# Patient Record
Sex: FEMALE | Race: WHITE | Employment: FULL TIME | URBAN - METROPOLITAN AREA
[De-identification: names, ages, dates, MRNs, and addresses within clinical notes are randomized per-mention and may not be internally consistent; named-entity substitution may affect disease eponyms.]

---

## 2018-02-26 PROBLEM — O99.519 ASTHMA AFFECTING PREGNANCY, ANTEPARTUM: Status: ACTIVE | Noted: 2018-02-26

## 2018-02-26 PROBLEM — J45.909 ASTHMA AFFECTING PREGNANCY, ANTEPARTUM: Status: ACTIVE | Noted: 2018-02-26

## 2018-02-26 PROBLEM — E03.9 HYPOTHYROID IN PREGNANCY, ANTEPARTUM, FIRST TRIMESTER: Status: ACTIVE | Noted: 2018-02-26

## 2018-02-26 PROBLEM — Z34.81 MULTIGRAVIDA IN FIRST TRIMESTER: Status: ACTIVE | Noted: 2018-02-26

## 2018-02-26 PROBLEM — O99.341 DEPRESSION DURING PREGNANCY IN FIRST TRIMESTER: Status: ACTIVE | Noted: 2018-02-26

## 2018-02-26 PROBLEM — O99.281 HYPOTHYROID IN PREGNANCY, ANTEPARTUM, FIRST TRIMESTER: Status: ACTIVE | Noted: 2018-02-26

## 2018-02-26 PROBLEM — F32.A DEPRESSION DURING PREGNANCY IN FIRST TRIMESTER: Status: ACTIVE | Noted: 2018-02-26

## 2018-05-21 PROBLEM — O28.3 FETAL ECHOGENIC INTRACARDIAC FOCUS ON PRENATAL ULTRASOUND: Status: ACTIVE | Noted: 2018-05-21

## 2018-05-21 PROBLEM — O99.342 DEPRESSION DURING PREGNANCY IN SECOND TRIMESTER: Status: ACTIVE | Noted: 2018-02-26

## 2018-05-21 PROBLEM — O99.282 HYPOTHYROIDISM AFFECTING PREGNANCY IN SECOND TRIMESTER: Status: ACTIVE | Noted: 2018-02-26

## 2018-05-21 PROBLEM — Z34.82 MULTIGRAVIDA IN SECOND TRIMESTER: Status: ACTIVE | Noted: 2018-02-26

## 2018-08-15 PROBLEM — O99.343: Status: ACTIVE | Noted: 2018-02-26

## 2018-08-15 PROBLEM — Z34.83 MULTIGRAVIDA IN THIRD TRIMESTER: Status: ACTIVE | Noted: 2018-02-26

## 2018-08-15 PROBLEM — O99.283 HYPOTHYROIDISM AFFECTING PREGNANCY IN THIRD TRIMESTER: Status: ACTIVE | Noted: 2018-02-26

## 2018-09-24 ENCOUNTER — HOSPITAL ENCOUNTER (EMERGENCY)
Age: 23
Discharge: HOME | End: 2018-09-24
Attending: OBSTETRICS & GYNECOLOGY | Admitting: OBSTETRICS & GYNECOLOGY
Payer: COMMERCIAL

## 2018-09-24 VITALS
HEIGHT: 61 IN | WEIGHT: 147 LBS | DIASTOLIC BLOOD PRESSURE: 69 MMHG | HEART RATE: 80 BPM | BODY MASS INDEX: 27.75 KG/M2 | TEMPERATURE: 97.7 F | SYSTOLIC BLOOD PRESSURE: 106 MMHG | RESPIRATION RATE: 16 BRPM

## 2018-09-24 PROBLEM — O36.8130 DECREASED FETAL MOVEMENT AFFECTING MANAGEMENT OF PREGNANCY IN THIRD TRIMESTER: Status: ACTIVE | Noted: 2018-09-24

## 2018-09-24 PROCEDURE — 99282 EMERGENCY DEPT VISIT SF MDM: CPT

## 2018-09-24 PROCEDURE — 76815 OB US LIMITED FETUS(S): CPT

## 2018-09-24 PROCEDURE — 59025 FETAL NON-STRESS TEST: CPT

## 2018-09-24 NOTE — IP AVS SNAPSHOT
[address] 
[phone] Patient: Abraham Ball MRN: [id number] MSD:6/36/7231 A check beth indicates which time of day the medication should be taken. My Medications ASK your doctor about these medications Instructions Each Dose to Equal  
 Morning Noon Evening Bedtime  
 albuterol 90 mcg/actuation inhaler Commonly known as:  PROAIR HFA Your last dose was: Your next dose is:    
   
   
 1-2 puffs q 4-6 hours prn wheezing  
     
   
   
   
  
 ferrous sulfate 324 mg (65 mg iron) tablet Your last dose was: Your next dose is: Take  by mouth Daily (before breakfast). levothyroxine 25 mcg tablet Commonly known as:  synthroid Your last dose was: Your next dose is: Take 1 Tab by mouth Daily (before breakfast). 25 mcg  
    
   
   
   
  
 mometasone-formoterol 200-5 mcg/actuation HFA inhaler Commonly known as:  Cheli Lopez Your last dose was: Your next dose is: Take 2 Puffs by inhalation two (2) times a day. 2 Puff  
    
   
   
   
  
 montelukast 10 mg tablet Commonly known as:  SINGULAIR Your last dose was: Your next dose is: Take 1 Tab by mouth daily. 10 mg PRENATAL PO Your last dose was: Your next dose is: Take  by mouth. ZyrTEC 10 mg Cap Generic drug:  Cetirizine Your last dose was: Your next dose is: Take  by mouth.

## 2018-09-24 NOTE — IP AVS SNAPSHOT
[address] 
[phone] Patient: Darleen Salazar MRN: [id number] ZEU:4[birth year]00/[birth year]48 About your hospitalization You were admitted on:  N/A You last received care in the:  [facility] You were discharged on:  September 24, 2018 Why you were hospitalized Your primary diagnosis was:  Not on File Your diagnoses also included:  Decreased Fetal Movement Affecting Management Of Pregnancy In Third Trimester Follow-up Information Follow up With Details Comments Contact Info George Duke MD   [address] 
[phone] Your Scheduled Appointments Tuesday September 25, 2018  1:30 PM EDT  
EST OB with MD [facility] ([facility]) [address] [place] [address]  
[phone] Thursday November 01, 2018  3:00 PM EDT  
LAB with [facility]  [address] ([place] [address]  
[phone] Thursday November 08, 2018  3:00 PM EST Extended Office Visit with Juice Aceves PA-C [address] ([place]) [place] [address]  
[phone] Discharge Orders None A check beth indicates which time of day the medication should be taken. My Medications ASK your doctor about these medications Instructions Each Dose to Equal  
 Morning Noon Evening Bedtime  
 albuterol 90 mcg/actuation inhaler Commonly known as:  PROAIR HFA Your last dose was: Your next dose is:    
   
   
 1-2 puffs q 4-6 hours prn wheezing  
     
   
   
   
  
 ferrous sulfate 324 mg (65 mg iron) tablet Your last dose was: Your next dose is: Take  by mouth Daily (before breakfast). levothyroxine 25 mcg tablet Commonly known as:  synthroid Your last dose was: Your next dose is: Take 1 Tab by mouth Daily (before breakfast). 25 mcg  
    
   
   
   
  
 mometasone-formoterol 200-5 mcg/actuation HFA inhaler Commonly known as:  Mccormick Marc Your last dose was: Your next dose is: Take 2 Puffs by inhalation two (2) times a day. 2 Puff  
    
   
   
   
  
 montelukast 10 mg tablet Commonly known as:  SINGULAIR Your last dose was: Your next dose is: Take 1 Tab by mouth daily. 10 mg PRENATAL PO Your last dose was: Your next dose is: Take  by mouth. ZyrTEC 10 mg Cap Generic drug:  Cetirizine Your last dose was: Your next dose is: Take  by mouth. Discharge Instructions Follow up as scheduled in office. Counting Your Baby's Kicks: Care Instructions Your Care Instructions Counting your baby's kicks is one way your doctor can tell that your baby is healthy. Most women-especially in a first pregnancy-feel their baby move for the first time between 16 and 22 weeks. The movement may feel like flutters rather than kicks. Your baby may move more at certain times of the day. When you are active, you may notice less kicking than when you are resting. At your prenatal visits, your doctor will ask whether the baby is active. In your last trimester, your doctor may ask you to count the number of times you feel your baby move. Follow-up care is a key part of your treatment and safety. Be sure to make and go to all appointments, and call your doctor if you are having problems. It's also a good idea to know your test results and keep a list of the medicines you take. How do you count fetal kicks?  
· A common method of checking your baby's movement is to count the number of kicks or moves you feel in 1 hour. Ten movements (such as kicks, flutters, or rolls) in 1 hour are normal. Some doctors suggest that you count in the morning until you get to 10 movements. Then you can quit for that day and start again the next day. · Pick your baby's most active time of day to count. This may be any time from morning to evening. · If you do not feel 10 movements in an hour, your baby may be sleeping. Wait for the next hour and count again. When should you call for help? Call your doctor now or seek immediate medical care if: 
  · You noticed that your baby has stopped moving or is moving much less than normal.  
 Watch closely for changes in your health, and be sure to contact your doctor if you have any problems. Where can you learn more? Go to http://tanner-jt.info/. Enter Z630 in the search box to learn more about \"Counting Your Baby's Kicks: Care Instructions. \" Current as of: 2017 Content Version: 11.7 © 0302-2499 [employer]. Care instructions adapted under license by [employer] (which disclaims liability or warranty for this information). If you have questions about a medical condition or this instruction, always ask your healthcare professional. [address] any warranty or liability for your use of this information. Pregnancy Precautions: Care Instructions Your Care Instructions There is no sure way to prevent labor before your due date ( labor) or to prevent most other pregnancy problems. But there are things you can do to increase your chances of a healthy pregnancy. Go to your appointments, follow your doctor's advice, and take good care of yourself. Eat well, and exercise (if your doctor agrees). And make sure to drink plenty of water. Follow-up care is a key part of your treatment and safety. Be sure to make and go to all appointments, and call your doctor if you are having problems. It's also a good idea to know your test results and keep a list of the medicines you take. How can you care for yourself at home? · Make sure you go to your prenatal appointments. At each visit, your doctor will check your blood pressure. Your doctor will also check to see if you have protein in your urine. High blood pressure and protein in urine are signs of preeclampsia. This condition can be dangerous for you and your baby. · Drink plenty of fluids, enough so that your urine is light yellow or clear like water. Dehydration can cause contractions. If you have kidney, heart, or liver disease and have to limit fluids, talk with your doctor before you increase the amount of fluids you drink. · Tell your doctor right away if you notice any symptoms of an infection, such as: ¨ Burning when you urinate. ¨ A foul-smelling discharge from your vagina. ¨ Vaginal itching. ¨ Unexplained fever. ¨ Unusual pain or soreness in your uterus or lower belly. · Eat a balanced diet. Include plenty of foods that are high in calcium and iron. ¨ Foods high in calcium include milk, cheese, yogurt, almonds, and broccoli. ¨ Foods high in iron include red meat, shellfish, poultry, eggs, beans, raisins, whole-grain bread, and leafy green vegetables. · Do not smoke. If you need help quitting, talk to your doctor about stop-smoking programs and medicines. These can increase your chances of quitting for good. · Do not drink alcohol or use illegal drugs. · Follow your doctor's directions about activity. Your doctor will let you know how much, if any, exercise you can do. · Ask your doctor if you can have sex. If you are at risk for early labor, your doctor may ask you to not have sex. · Take care to prevent falls. During pregnancy, your joints are loose, and your balance is off. Sports such as bicycling, skiing, or in-line skating can increase your risk of falling.  And don't ride horses or motorcycles, dive, water ski, scuba dive, or parachute jump while you are pregnant. · Avoid getting very hot. Do not use saunas or hot tubs. Avoid staying out in the sun in hot weather for long periods. Take acetaminophen (Tylenol) to lower a high fever. · Do not take any over-the-counter or herbal medicines or supplements without talking to your doctor or pharmacist first. 
When should you call for help? Call 911 anytime you think you may need emergency care. For example, call if: 
  · You passed out (lost consciousness).  
  · You have severe vaginal bleeding.  
  · You have severe pain in your belly or pelvis.  
  · You have had fluid gushing or leaking from your vagina and you know or think the umbilical cord is bulging into your vagina. If this happens, immediately get down on your knees so your rear end (buttocks) is higher than your head. This will decrease the pressure on the cord until help arrives.  
[facility] your doctor now or seek immediate medical care if: 
  · You have signs of preeclampsia, such as: 
¨ Sudden swelling of your face, hands, or feet. ¨ New vision problems (such as dimness or blurring). ¨ A severe headache.  
  · You have any vaginal bleeding.  
  · You have belly pain or cramping.  
  · You have a fever.  
  · You have had regular contractions (with or without pain) for an hour. This means that you have 8 or more within 1 hour or 4 or more in 20 minutes after you change your position and drink fluids.  
  · You have a sudden release of fluid from your vagina.  
  · You have low back pain or pelvic pressure that does not go away.  
  · You notice that your baby has stopped moving or is moving much less than normal.  
 Watch closely for changes in your health, and be sure to contact your doctor if you have any problems. Where can you learn more? Go to http://tanner-jt.info/. Enter [id number] in the search box to learn more about \"Pregnancy Precautions: Care Instructions. \" Current as of: November 21, 2017 
Content Version: 11.7 © 3142-6376 [employer]. Care instructions adapted under license by [employer] (which disclaims liability or warranty for this information). If you have questions about a medical condition or this instruction, always ask your healthcare professional. [address] any warranty or liability for your use of this information. Introducing \[facility]\"" & HEALTH SERVICES! [facility] introduces [facility] patient portal. Now you can access parts of your medical record, email your doctor's office, and request medication refills online. 1. In your internet browser, go to [email or web address] 2. Click on the First Time User? Click Here link in the Sign In box. You will see the New Member Sign Up page. 3. Enter your [facility] Access Code exactly as it appears below. You will not need to use this code after youve completed the sign-up process. If you do not sign up before the expiration date, you must request a new code. · [facility] Access Code: [email or web address] Expires: 11/12/2018  8:05 AM 
 
4. Enter the last four digits of your Social Security Number (xxxx) and Date of Birth (mm/dd/yyyy) as indicated and click Submit. You will be taken to the next sign-up page. 5. Create a [facility] ID. This will be your [facility] login ID and cannot be changed, so think of one that is secure and easy to remember. 6. Create a [facility] password. You can change your password at any time. 7. Enter your Password Reset Question and Answer. This can be used at a later time if you forget your password. 8. Enter your e-mail address. You will receive e-mail notification when new information is available in [address] 9. Click Sign Up. You can now view and download portions of your medical record. 10. Click the Download Summary menu link to download a portable copy of your medical information.  
 
If you have questions, please visit the Frequently Asked Questions section of the idiagt website. Remember, Perpetu is NOT to be used for urgent needs. For medical emergencies, dial 911. Now available from your iPhone and Android! Introducing Sae Hussein As a [employer] patient, I wanted to make you aware of our electronic visit tool called Sae Hussein. [employer] 24/7 allows you to connect within minutes with a medical provider 24 hours a day, seven days a week via a mobile device or tablet or logging into a secure website from your computer. You can access Sae Hussein from anywhere in the [place]. A virtual visit might be right for you when you have a simple condition and feel like you just dont want to get out of bed, or cant get away from work for an appointment, when your regular [employer] provider is not available (evenings, weekends or holidays), or when youre out of town and need minor care. Electronic visits cost only $49 and if the [employer] 24/7 provider determines a prescription is needed to treat your condition, one can be electronically transmitted to a nearby pharmacy*. Please take a moment to enroll today if you have not already done so. The enrollment process is free and takes just a few minutes. To enroll, please download the [employer] 24/7 ruel to your tablet or phone, or visit [email or web address] to enroll on your computer. And, as an [address] [place] [address] patient with a Prelert account, the results of your visits will be scanned into your electronic medical record and your primary care provider will be able to view the scanned results. We urge you to continue to see your regular [employer] provider for your ongoing medical care. And while your primary care provider may not be the one available when you seek a Sae Hussein virtual visit, the peace of mind you get from getting a real diagnosis real time can be priceless.    
 
For more information on [employer] 24/7, view our Frequently Asked Questions (FAQs) at [email or web address]. Sincerely, 
 
Amanda Calderon MD 
Chief Medical Officer [address] *:  certain medications cannot be prescribed via Sae Hussein Providers Seen During Your Hospitalization Provider Specialty Primary office phone Carlos Walker MD Obstetrics & Gynecology [phone] Your Primary Care Physician (PCP) Primary Care Physician Office Phone Office Fax [address] Sol, [address] [place] [address] [phone] You are allergic to the following Allergen Reactions Egg Anaphylaxis Influenza Virus Vaccine, Specific Anaphylaxis Peanut Anaphylaxis Adderall (Dextroamphetamine-Amphetamine) Other (comments) HEADACHE Augmentin (Amoxicillin-Pot Clavulanate) Rash Penicillins Rash Recent Documentation Height Weight BMI OB Status Smoking Status 1.549 m 66.7 kg 27.78 kg/m2 Pregnant Never Smoker Emergency Contacts Name Discharge Info Relation Home Work Mobile Ileana Figures  Parent [1] [phone] Christa Thomson  Spouse [3] [phone] Patient Belongings The following personal items are in your possession at time of discharge: 
                             
 
  
  
 Please provide this summary of care documentation to your next provider. Signatures-by signing, you are acknowledging that this After Visit Summary has been reviewed with you and you have received a copy. Patient Signature:  ____________________________________________________________ Date:  ____________________________________________________________  
  
Dillon Peng Provider Signature:  ____________________________________________________________ Date:  ____________________________________________________________

## 2018-09-25 NOTE — H&P
CC  Chief Complaint   Patient presents with    Decreased Fetal Movement       History:    21 y.o. female at 36w4d weeks gestation who requesting evaluation for Decreased fetal movement: Time of onset past several days. HISTORY:  OB History    Para Term  AB Living   2 1 1   1   SAB TAB Ectopic Molar Multiple Live Births        1      # Outcome Date GA Lbr Chris/2nd Weight Sex Delivery Anes PTL Lv   2 Current            1 Term 16 41w4d  3.374 kg M Vag-Spont EPI N HAIM          History   Sexual Activity    Sexual activity: Yes    Partners: Male    Birth control/ protection: None     Patient's last menstrual period was 2018. Social History     Social History    Marital status: SINGLE     Spouse name: N/A    Number of children: N/A    Years of education: N/A     Occupational History    Not on file.      Social History Main Topics    Smoking status: Never Smoker    Smokeless tobacco: Never Used    Alcohol use No    Drug use: No    Sexual activity: Yes     Partners: Male     Birth control/ protection: None     Other Topics Concern    Caffeine Concern No    Exercise No    Seat Belt Yes    Self-Exams No     Social History Narrative    Abuse: Feels safe at home, no history of physical abuse, no history of sexual abuse           Past Surgical History:   Procedure Laterality Date    HX HEENT   2013    sinus     HX TONSIL AND ADENOIDECTOMY      INSERT PLEURAL CATHETER      drain fluid off lung age 11-9       Past Medical History:   Diagnosis Date    Allergic rhinitis due to pollen     Asthma     Attention deficit disorder without mention of hyperactivity     Chronic tension headache     Contact dermatitis and other eczema, due to unspecified cause     Depressive disorder, not elsewhere classified     Dizziness and giddiness     Folliculitis     Herpetic gingivostomatitis     Hypothyroidism     Irritability     Knee pain     Low back pain     Myalgia and myositis, unspecified     Nausea alone     Other kyphoscoliosis and scoliosis     Personal history of allergy to penicillin     Raynaud's syndrome     Reflux          ROS:  No vb, lof, regular contractions    PHYSICAL EXAM:  Blood pressure 106/69, pulse 80, temperature 97.7 °F (36.5 °C), resp. rate 16, height 5' 1\" (1.549 m), weight 66.7 kg (147 lb), last menstrual period 01/02/2018, not currently breastfeeding. General: healthy, alert, well developed and well nourished  Resp:  breath sounds clear and equal bilaterally  Card:  RRR, no MRG  Abd: WNL. Fetal Assessment: Baseline FHR: 135 per minute     Fetal heart variability: moderate     Fetal Heart Rate decelerations: none     Fetal Heart Rate accelerations: yes  Prestentation: vertex by us  Pelvic:  deferred  Ext: no edema     SAMUEL 15    Assessment:  21 y.o. female at 36w4d weeks gestation with decreased fetal movement  with reassuring fetal status    Plan:  Discharge home with routine labor instructions and warning signs, Keep follow up appointment with regular provider as scheduled, Instructed in fetal activity monitoring     Alejandra Tran MD

## 2018-09-25 NOTE — PROGRESS NOTES
9/24/2018      RE: Aroldo Mccray      To Whom it May Concern: This is to certify that Aroldo Mccray may return to work on Wednesday, September 26. 2018. She was seen in our [address] on the 24th and will need to be out of work until Wednesday. Please feel free to contact her ob/gyn office if you have any questions or concerns. Thank you for your assistance in this matter.     Sincerely,      Samantha Duncan RN

## 2018-09-25 NOTE — PROGRESS NOTES
Pt d/c'd home in stable condition with FOB. Written and verbal d/c instructions given with pregnancy precautions, kick counts and  labor precautions. Pt states understanding, all questions answered. Pt to f/u as scheduled in office.

## 2018-09-25 NOTE — DISCHARGE INSTRUCTIONS
Follow up as scheduled in office. Counting Your Baby's Kicks: Care Instructions  Your Care Instructions    Counting your baby's kicks is one way your doctor can tell that your baby is healthy. Most women-especially in a first pregnancy-feel their baby move for the first time between 16 and 22 weeks. The movement may feel like flutters rather than kicks. Your baby may move more at certain times of the day. When you are active, you may notice less kicking than when you are resting. At your prenatal visits, your doctor will ask whether the baby is active. In your last trimester, your doctor may ask you to count the number of times you feel your baby move. Follow-up care is a key part of your treatment and safety. Be sure to make and go to all appointments, and call your doctor if you are having problems. It's also a good idea to know your test results and keep a list of the medicines you take. How do you count fetal kicks? · A common method of checking your baby's movement is to count the number of kicks or moves you feel in 1 hour. Ten movements (such as kicks, flutters, or rolls) in 1 hour are normal. Some doctors suggest that you count in the morning until you get to 10 movements. Then you can quit for that day and start again the next day. · Pick your baby's most active time of day to count. This may be any time from morning to evening. · If you do not feel 10 movements in an hour, your baby may be sleeping. Wait for the next hour and count again. When should you call for help? Call your doctor now or seek immediate medical care if:    · You noticed that your baby has stopped moving or is moving much less than normal.    Watch closely for changes in your health, and be sure to contact your doctor if you have any problems. Where can you learn more? Go to http://tanner-jt.info/.   Enter L421 in the search box to learn more about \"Counting Your Baby's Kicks: Care Instructions. \"  Current as of: 2017  Content Version: 11.7  © 3514-9211 [employer]. Care instructions adapted under license by [employer] (which disclaims liability or warranty for this information). If you have questions about a medical condition or this instruction, always ask your healthcare professional. [address] any warranty or liability for your use of this information. Pregnancy Precautions: Care Instructions  Your Care Instructions    There is no sure way to prevent labor before your due date ( labor) or to prevent most other pregnancy problems. But there are things you can do to increase your chances of a healthy pregnancy. Go to your appointments, follow your doctor's advice, and take good care of yourself. Eat well, and exercise (if your doctor agrees). And make sure to drink plenty of water. Follow-up care is a key part of your treatment and safety. Be sure to make and go to all appointments, and call your doctor if you are having problems. It's also a good idea to know your test results and keep a list of the medicines you take. How can you care for yourself at home? · Make sure you go to your prenatal appointments. At each visit, your doctor will check your blood pressure. Your doctor will also check to see if you have protein in your urine. High blood pressure and protein in urine are signs of preeclampsia. This condition can be dangerous for you and your baby. · Drink plenty of fluids, enough so that your urine is light yellow or clear like water. Dehydration can cause contractions. If you have kidney, heart, or liver disease and have to limit fluids, talk with your doctor before you increase the amount of fluids you drink. · Tell your doctor right away if you notice any symptoms of an infection, such as:  ¨ Burning when you urinate. ¨ A foul-smelling discharge from your vagina. ¨ Vaginal itching.   ¨ Unexplained fever.  ¨ Unusual pain or soreness in your uterus or lower belly. · Eat a balanced diet. Include plenty of foods that are high in calcium and iron. ¨ Foods high in calcium include milk, cheese, yogurt, almonds, and broccoli. ¨ Foods high in iron include red meat, shellfish, poultry, eggs, beans, raisins, whole-grain bread, and leafy green vegetables. · Do not smoke. If you need help quitting, talk to your doctor about stop-smoking programs and medicines. These can increase your chances of quitting for good. · Do not drink alcohol or use illegal drugs. · Follow your doctor's directions about activity. Your doctor will let you know how much, if any, exercise you can do. · Ask your doctor if you can have sex. If you are at risk for early labor, your doctor may ask you to not have sex. · Take care to prevent falls. During pregnancy, your joints are loose, and your balance is off. Sports such as bicycling, skiing, or in-line skating can increase your risk of falling. And don't ride horses or motorcycles, dive, water ski, scuba dive, or parachute jump while you are pregnant. · Avoid getting very hot. Do not use saunas or hot tubs. Avoid staying out in the sun in hot weather for long periods. Take acetaminophen (Tylenol) to lower a high fever. · Do not take any over-the-counter or herbal medicines or supplements without talking to your doctor or pharmacist first.  When should you call for help? Call 911 anytime you think you may need emergency care. For example, call if:    · You passed out (lost consciousness).     · You have severe vaginal bleeding.     · You have severe pain in your belly or pelvis.     · You have had fluid gushing or leaking from your vagina and you know or think the umbilical cord is bulging into your vagina. If this happens, immediately get down on your knees so your rear end (buttocks) is higher than your head.  This will decrease the pressure on the cord until help arrives.   [facility] your doctor now or seek immediate medical care if:    · You have signs of preeclampsia, such as:  ¨ Sudden swelling of your face, hands, or feet. ¨ New vision problems (such as dimness or blurring). ¨ A severe headache.     · You have any vaginal bleeding.     · You have belly pain or cramping.     · You have a fever.     · You have had regular contractions (with or without pain) for an hour. This means that you have 8 or more within 1 hour or 4 or more in 20 minutes after you change your position and drink fluids.     · You have a sudden release of fluid from your vagina.     · You have low back pain or pelvic pressure that does not go away.     · You notice that your baby has stopped moving or is moving much less than normal.    Watch closely for changes in your health, and be sure to contact your doctor if you have any problems. Where can you learn more? Go to http://tanner-jt.info/. Enter [id number] in the search box to learn more about \"Pregnancy Precautions: Care Instructions. \"  Current as of: November 21, 2017  Content Version: 11.7  © 0406-2004 [employer]. Care instructions adapted under license by [employer] (which disclaims liability or warranty for this information). If you have questions about a medical condition or this instruction, always ask your healthcare professional. [address] any warranty or liability for your use of this information.

## 2018-09-25 NOTE — PROGRESS NOTES
Pt to triage room MATHEUS 02 with c/o decreased fetal movement. Pt denies any LOF or VB. EFM/Alvan applied. Triage database and assessment completed. SVE deferred  Triage process explained to pt. Pt instructed on call light and placed within reach. Verbalized understanding to all teaching. Pt states she has felt fetal movement today, just less.   This RN will notify Dr Ena Severe of pt arrival, gest age, contraction pattern, hx, complaint etc.

## 2018-10-04 PROBLEM — O36.8130 DECREASED FETAL MOVEMENT AFFECTING MANAGEMENT OF PREGNANCY IN THIRD TRIMESTER: Status: RESOLVED | Noted: 2018-09-24 | Resolved: 2018-10-04

## 2018-10-11 PROBLEM — O36.5930 POOR FETAL GROWTH AFFECTING MANAGEMENT OF MOTHER IN THIRD TRIMESTER: Status: ACTIVE | Noted: 2018-10-11

## 2018-10-14 ENCOUNTER — ANESTHESIA (OUTPATIENT)
Dept: LABOR AND DELIVERY | Age: 23
DRG: 540 | End: 2018-10-14
Payer: COMMERCIAL

## 2018-10-14 ENCOUNTER — ANESTHESIA EVENT (OUTPATIENT)
Dept: LABOR AND DELIVERY | Age: 23
DRG: 540 | End: 2018-10-14
Payer: COMMERCIAL

## 2018-10-14 ENCOUNTER — HOSPITAL ENCOUNTER (INPATIENT)
Age: 23
LOS: 3 days | Discharge: HOME | DRG: 540 | End: 2018-10-17
Attending: OBSTETRICS & GYNECOLOGY | Admitting: OBSTETRICS & GYNECOLOGY
Payer: COMMERCIAL

## 2018-10-14 DIAGNOSIS — Z98.891 S/P CESAREAN SECTION: Primary | ICD-10-CM

## 2018-10-14 PROBLEM — O41.03X0 OLIGOHYDRAMNIOS IN THIRD TRIMESTER: Status: ACTIVE | Noted: 2018-10-14

## 2018-10-14 PROBLEM — O36.8390 VARIABLE FETAL HEART RATE DECELERATIONS, ANTEPARTUM: Status: ACTIVE | Noted: 2018-10-14

## 2018-10-14 PROBLEM — O36.5933 IUGR (INTRAUTERINE GROWTH RESTRICTION) AFFECTING CARE OF MOTHER, THIRD TRIMESTER, FETUS 3: Status: ACTIVE | Noted: 2018-10-14

## 2018-10-14 PROBLEM — O26.893 ABDOMINAL PAIN DURING PREGNANCY IN THIRD TRIMESTER: Status: ACTIVE | Noted: 2018-10-14

## 2018-10-14 PROBLEM — R10.9 ABDOMINAL PAIN DURING PREGNANCY IN THIRD TRIMESTER: Status: ACTIVE | Noted: 2018-10-14

## 2018-10-14 PROBLEM — Z37.9 NORMAL LABOR: Status: ACTIVE | Noted: 2018-10-14

## 2018-10-14 LAB
ABO + RH BLD: NORMAL
ALBUMIN SERPL-MCNC: 2.8 G/DL (ref 3.5–5)
ALBUMIN/GLOB SERPL: 0.8 {RATIO}
ALP SERPL-CCNC: 109 U/L (ref 50–136)
ALT SERPL-CCNC: 14 U/L (ref 12–65)
AMPHET UR QL SCN: NEGATIVE
ANION GAP SERPL CALC-SCNC: 7 MMOL/L
ARTERIAL PATENCY WRIST A: ABNORMAL
ARTERIAL PATENCY WRIST A: NORMAL
AST SERPL-CCNC: 12 U/L (ref 15–37)
BARBITURATES UR QL SCN: NEGATIVE
BASE DEFICIT BLD-SCNC: 2 MMOL/L
BASE EXCESS BLD CALC-SCNC: 3 MMOL/L
BDY SITE: ABNORMAL
BDY SITE: NORMAL
BENZODIAZ UR QL: NEGATIVE
BILIRUB SERPL-MCNC: 0.6 MG/DL (ref 0.2–1.1)
BLOOD GROUP ANTIBODIES SERPL: NORMAL
BODY TEMPERATURE: 98.6
BODY TEMPERATURE: 98.6
BUN SERPL-MCNC: 7 MG/DL (ref 6–23)
CALCIUM SERPL-MCNC: 8.7 MG/DL (ref 8.3–10.4)
CANNABINOIDS UR QL SCN: NEGATIVE
CHLORIDE SERPL-SCNC: 106 MMOL/L (ref 98–107)
CO2 SERPL-SCNC: 26 MMOL/L (ref 21–32)
COCAINE UR QL SCN: NEGATIVE
CREAT SERPL-MCNC: 0.64 MG/DL (ref 0.6–1)
ERYTHROCYTE [DISTWIDTH] IN BLOOD BY AUTOMATED COUNT: 11.9 %
GAS FLOW.O2 O2 DELIVERY SYS: ABNORMAL L/MIN
GAS FLOW.O2 O2 DELIVERY SYS: NORMAL L/MIN
GLOBULIN SER CALC-MCNC: 3.6 G/DL (ref 2.3–3.5)
GLUCOSE SERPL-MCNC: 86 MG/DL (ref 65–100)
HCO3 BLD-SCNC: 31 MMOL/L (ref 22–26)
HCT VFR BLD AUTO: 39.8 % (ref 35.8–46.3)
HGB BLD-MCNC: 13.1 G/DL (ref 11.7–15.4)
MCH RBC QN AUTO: 31.4 PG (ref 26.1–32.9)
MCHC RBC AUTO-ENTMCNC: 32.9 G/DL (ref 31.4–35)
MCV RBC AUTO: 95.4 FL (ref 79.6–97.8)
METHADONE UR QL: NEGATIVE
NRBC # BLD: 0 K/UL (ref 0–0.2)
OPIATES UR QL: NEGATIVE
PCO2 BLDCO: 45 MMHG (ref 32–68)
PCO2 BLDCO: 59 MMHG (ref 32–68)
PCP UR QL: NEGATIVE
PH BLDCO: 7.33 [PH] (ref 7.15–7.38)
PH BLDCO: 7.33 [PH] (ref 7.15–7.38)
PLATELET # BLD AUTO: 200 K/UL (ref 150–450)
PMV BLD AUTO: 11.1 FL (ref 9.4–12.3)
PO2 BLDCO: 11 MMHG
PO2 BLDCO: 20 MMHG
POTASSIUM SERPL-SCNC: 3.8 MMOL/L (ref 3.5–5.1)
PROT SERPL-MCNC: 6.4 G/DL
RBC # BLD AUTO: 4.17 M/UL (ref 4.05–5.2)
SAO2 % BLD: 10 % (ref 95–98)
SERVICE CMNT-IMP: ABNORMAL
SERVICE CMNT-IMP: NORMAL
SODIUM SERPL-SCNC: 139 MMOL/L (ref 136–145)
SPECIMEN EXP DATE BLD: NORMAL
SPECIMEN TYPE: ABNORMAL
SPECIMEN TYPE: NORMAL
URATE SERPL-MCNC: 4.4 MG/DL (ref 2.6–6)
WBC # BLD AUTO: 11.6 K/UL (ref 4.3–11.1)

## 2018-10-14 PROCEDURE — 77030018846 HC SOL IRR STRL H20 ICUM -A: Performed by: OBSTETRICS & GYNECOLOGY

## 2018-10-14 PROCEDURE — 77030034696 HC CATH URETH FOL 2W BARD -A: Performed by: OBSTETRICS & GYNECOLOGY

## 2018-10-14 PROCEDURE — 75410000003 HC RECOV DEL/VAG/CSECN EA 0.5 HR: Performed by: OBSTETRICS & GYNECOLOGY

## 2018-10-14 PROCEDURE — 77030005518 HC CATH URETH FOL 2W BARD -B: Performed by: OBSTETRICS & GYNECOLOGY

## 2018-10-14 PROCEDURE — 99284 EMERGENCY DEPT VISIT MOD MDM: CPT | Performed by: OBSTETRICS & GYNECOLOGY

## 2018-10-14 PROCEDURE — 74011250636 HC RX REV CODE- 250/636: Performed by: ANESTHESIOLOGY

## 2018-10-14 PROCEDURE — 74011000258 HC RX REV CODE- 258: Performed by: OBSTETRICS & GYNECOLOGY

## 2018-10-14 PROCEDURE — 86901 BLOOD TYPING SEROLOGIC RH(D): CPT

## 2018-10-14 PROCEDURE — 76060000078 HC EPIDURAL ANESTHESIA: Performed by: OBSTETRICS & GYNECOLOGY

## 2018-10-14 PROCEDURE — 74011250637 HC RX REV CODE- 250/637: Performed by: ANESTHESIOLOGY

## 2018-10-14 PROCEDURE — 74011250636 HC RX REV CODE- 250/636

## 2018-10-14 PROCEDURE — 76010000391 HC C SECN FIRST 1 HR: Performed by: OBSTETRICS & GYNECOLOGY

## 2018-10-14 PROCEDURE — 77030020255 HC SOL INJ LR 1000ML BG

## 2018-10-14 PROCEDURE — 76060000078 HC EPIDURAL ANESTHESIA

## 2018-10-14 PROCEDURE — 74011250636 HC RX REV CODE- 250/636: Performed by: OBSTETRICS & GYNECOLOGY

## 2018-10-14 PROCEDURE — 77030002888 HC SUT CHRMC J&J -A: Performed by: OBSTETRICS & GYNECOLOGY

## 2018-10-14 PROCEDURE — 82803 BLOOD GASES ANY COMBINATION: CPT

## 2018-10-14 PROCEDURE — 77030018836 HC SOL IRR NACL ICUM -A: Performed by: OBSTETRICS & GYNECOLOGY

## 2018-10-14 PROCEDURE — 65270000029 HC RM PRIVATE

## 2018-10-14 PROCEDURE — 77030032490 HC SLV COMPR SCD KNE COVD -B: Performed by: OBSTETRICS & GYNECOLOGY

## 2018-10-14 PROCEDURE — 75410000003 HC RECOV DEL/VAG/CSECN EA 0.5 HR

## 2018-10-14 PROCEDURE — 80307 DRUG TEST PRSMV CHEM ANLYZR: CPT

## 2018-10-14 PROCEDURE — 36415 COLL VENOUS BLD VENIPUNCTURE: CPT

## 2018-10-14 PROCEDURE — 4A1HXCZ MONITORING OF PRODUCTS OF CONCEPTION, CARDIAC RATE, EXTERNAL APPROACH: ICD-10-PCS | Performed by: OBSTETRICS & GYNECOLOGY

## 2018-10-14 PROCEDURE — 77030007880 HC KT SPN EPDRL BBMI -B: Performed by: ANESTHESIOLOGY

## 2018-10-14 PROCEDURE — 74011000250 HC RX REV CODE- 250

## 2018-10-14 PROCEDURE — 85027 COMPLETE CBC AUTOMATED: CPT

## 2018-10-14 PROCEDURE — 84550 ASSAY OF BLOOD/URIC ACID: CPT

## 2018-10-14 PROCEDURE — 80053 COMPREHEN METABOLIC PANEL: CPT

## 2018-10-14 PROCEDURE — 77030003665 HC NDL SPN BBMI -A: Performed by: ANESTHESIOLOGY

## 2018-10-14 PROCEDURE — 77030020254 HC SOL INJ D5LR LACTATED RINGER

## 2018-10-14 PROCEDURE — 77030031139 HC SUT VCRL2 J&J -A: Performed by: OBSTETRICS & GYNECOLOGY

## 2018-10-14 RX ORDER — MORPHINE SULFATE 10 MG/ML
5 INJECTION, SOLUTION INTRAMUSCULAR; INTRAVENOUS
Status: DISCONTINUED | OUTPATIENT
Start: 2018-10-14 | End: 2018-10-14 | Stop reason: SDUPTHER

## 2018-10-14 RX ORDER — ONDANSETRON 2 MG/ML
4 INJECTION INTRAMUSCULAR; INTRAVENOUS AS NEEDED
Status: DISCONTINUED | OUTPATIENT
Start: 2018-10-14 | End: 2018-10-14 | Stop reason: SDUPTHER

## 2018-10-14 RX ORDER — TRISODIUM CITRATE DIHYDRATE AND CITRIC ACID MONOHYDRATE 500; 334 MG/5ML; MG/5ML
30 SOLUTION ORAL
Status: COMPLETED | OUTPATIENT
Start: 2018-10-14 | End: 2018-10-14

## 2018-10-14 RX ORDER — SODIUM CHLORIDE 0.9 % (FLUSH) 0.9 %
5-10 SYRINGE (ML) INJECTION AS NEEDED
Status: DISCONTINUED | OUTPATIENT
Start: 2018-10-14 | End: 2018-10-15 | Stop reason: SDUPTHER

## 2018-10-14 RX ORDER — BUTORPHANOL TARTRATE 1 MG/ML
1 INJECTION INTRAMUSCULAR; INTRAVENOUS
Status: DISCONTINUED | OUTPATIENT
Start: 2018-10-14 | End: 2018-10-14 | Stop reason: HOSPADM

## 2018-10-14 RX ORDER — SODIUM CHLORIDE, SODIUM LACTATE, POTASSIUM CHLORIDE, CALCIUM CHLORIDE 600; 310; 30; 20 MG/100ML; MG/100ML; MG/100ML; MG/100ML
75 INJECTION, SOLUTION INTRAVENOUS CONTINUOUS
Status: DISCONTINUED | OUTPATIENT
Start: 2018-10-14 | End: 2018-10-17 | Stop reason: ALTCHOICE

## 2018-10-14 RX ORDER — EPHEDRINE SULFATE 50 MG/ML
INJECTION, SOLUTION INTRAVENOUS AS NEEDED
Status: DISCONTINUED | OUTPATIENT
Start: 2018-10-14 | End: 2018-10-14 | Stop reason: HOSPADM

## 2018-10-14 RX ORDER — SODIUM CHLORIDE 0.9 % (FLUSH) 0.9 %
5-10 SYRINGE (ML) INJECTION EVERY 8 HOURS
Status: DISCONTINUED | OUTPATIENT
Start: 2018-10-14 | End: 2018-10-15 | Stop reason: SDUPTHER

## 2018-10-14 RX ORDER — NALBUPHINE HYDROCHLORIDE 10 MG/ML
5 INJECTION, SOLUTION INTRAMUSCULAR; INTRAVENOUS; SUBCUTANEOUS
Status: DISCONTINUED | OUTPATIENT
Start: 2018-10-14 | End: 2018-10-15 | Stop reason: ALTCHOICE

## 2018-10-14 RX ORDER — ONDANSETRON 2 MG/ML
INJECTION INTRAMUSCULAR; INTRAVENOUS AS NEEDED
Status: DISCONTINUED | OUTPATIENT
Start: 2018-10-14 | End: 2018-10-14 | Stop reason: HOSPADM

## 2018-10-14 RX ORDER — LEVOTHYROXINE SODIUM 50 UG/1
25 TABLET ORAL DAILY
Status: DISCONTINUED | OUTPATIENT
Start: 2018-10-15 | End: 2018-10-17 | Stop reason: HOSPADM

## 2018-10-14 RX ORDER — NALBUPHINE HYDROCHLORIDE 10 MG/ML
5 INJECTION, SOLUTION INTRAMUSCULAR; INTRAVENOUS; SUBCUTANEOUS
Status: DISCONTINUED | OUTPATIENT
Start: 2018-10-14 | End: 2018-10-14 | Stop reason: SDUPTHER

## 2018-10-14 RX ORDER — NALOXONE HYDROCHLORIDE 0.4 MG/ML
0.1 INJECTION, SOLUTION INTRAMUSCULAR; INTRAVENOUS; SUBCUTANEOUS
Status: DISCONTINUED | OUTPATIENT
Start: 2018-10-14 | End: 2018-10-14 | Stop reason: SDUPTHER

## 2018-10-14 RX ORDER — KETOROLAC TROMETHAMINE 30 MG/ML
30 INJECTION, SOLUTION INTRAMUSCULAR; INTRAVENOUS
Status: DISPENSED | OUTPATIENT
Start: 2018-10-14 | End: 2018-10-15

## 2018-10-14 RX ORDER — SODIUM CHLORIDE, SODIUM LACTATE, POTASSIUM CHLORIDE, CALCIUM CHLORIDE 600; 310; 30; 20 MG/100ML; MG/100ML; MG/100ML; MG/100ML
INJECTION, SOLUTION INTRAVENOUS
Status: DISCONTINUED | OUTPATIENT
Start: 2018-10-14 | End: 2018-10-14 | Stop reason: HOSPADM

## 2018-10-14 RX ORDER — KETOROLAC TROMETHAMINE 30 MG/ML
30 INJECTION, SOLUTION INTRAMUSCULAR; INTRAVENOUS
Status: DISCONTINUED | OUTPATIENT
Start: 2018-10-14 | End: 2018-10-14 | Stop reason: SDUPTHER

## 2018-10-14 RX ORDER — MORPHINE SULFATE 10 MG/ML
INJECTION, SOLUTION INTRAMUSCULAR; INTRAVENOUS AS NEEDED
Status: DISCONTINUED | OUTPATIENT
Start: 2018-10-14 | End: 2018-10-14 | Stop reason: HOSPADM

## 2018-10-14 RX ORDER — MINERAL OIL
120 OIL (ML) ORAL
Status: DISCONTINUED | OUTPATIENT
Start: 2018-10-14 | End: 2018-10-14 | Stop reason: HOSPADM

## 2018-10-14 RX ORDER — NALOXONE HYDROCHLORIDE 0.4 MG/ML
0.1 INJECTION, SOLUTION INTRAMUSCULAR; INTRAVENOUS; SUBCUTANEOUS
Status: DISCONTINUED | OUTPATIENT
Start: 2018-10-14 | End: 2018-10-15 | Stop reason: ALTCHOICE

## 2018-10-14 RX ORDER — FENTANYL CITRATE 50 UG/ML
INJECTION, SOLUTION INTRAMUSCULAR; INTRAVENOUS AS NEEDED
Status: DISCONTINUED | OUTPATIENT
Start: 2018-10-14 | End: 2018-10-14 | Stop reason: HOSPADM

## 2018-10-14 RX ORDER — LIDOCAINE HYDROCHLORIDE 10 MG/ML
1 INJECTION INFILTRATION; PERINEURAL
Status: DISCONTINUED | OUTPATIENT
Start: 2018-10-14 | End: 2018-10-14 | Stop reason: HOSPADM

## 2018-10-14 RX ORDER — SODIUM CHLORIDE, SODIUM LACTATE, POTASSIUM CHLORIDE, CALCIUM CHLORIDE 600; 310; 30; 20 MG/100ML; MG/100ML; MG/100ML; MG/100ML
75 INJECTION, SOLUTION INTRAVENOUS CONTINUOUS
Status: DISCONTINUED | OUTPATIENT
Start: 2018-10-14 | End: 2018-10-14 | Stop reason: SDUPTHER

## 2018-10-14 RX ORDER — OXYCODONE HYDROCHLORIDE 5 MG/1
10 TABLET ORAL
Status: DISCONTINUED | OUTPATIENT
Start: 2018-10-14 | End: 2018-10-14 | Stop reason: SDUPTHER

## 2018-10-14 RX ORDER — SODIUM CHLORIDE 0.9 % (FLUSH) 0.9 %
5-10 SYRINGE (ML) INJECTION EVERY 8 HOURS
Status: DISCONTINUED | OUTPATIENT
Start: 2018-10-14 | End: 2018-10-14 | Stop reason: HOSPADM

## 2018-10-14 RX ORDER — OXYTOCIN/RINGER'S LACTATE 30/500 ML
PLASTIC BAG, INJECTION (ML) INTRAVENOUS
Status: DISCONTINUED | OUTPATIENT
Start: 2018-10-14 | End: 2018-10-14 | Stop reason: HOSPADM

## 2018-10-14 RX ORDER — MORPHINE SULFATE 10 MG/ML
5 INJECTION, SOLUTION INTRAMUSCULAR; INTRAVENOUS
Status: ACTIVE | OUTPATIENT
Start: 2018-10-14 | End: 2018-10-15

## 2018-10-14 RX ORDER — DEXTROSE, SODIUM CHLORIDE, SODIUM LACTATE, POTASSIUM CHLORIDE, AND CALCIUM CHLORIDE 5; .6; .31; .03; .02 G/100ML; G/100ML; G/100ML; G/100ML; G/100ML
125 INJECTION, SOLUTION INTRAVENOUS CONTINUOUS
Status: DISCONTINUED | OUTPATIENT
Start: 2018-10-14 | End: 2018-10-14 | Stop reason: HOSPADM

## 2018-10-14 RX ORDER — ONDANSETRON 2 MG/ML
4 INJECTION INTRAMUSCULAR; INTRAVENOUS AS NEEDED
Status: COMPLETED | OUTPATIENT
Start: 2018-10-14 | End: 2018-10-15

## 2018-10-14 RX ORDER — SODIUM CHLORIDE 0.9 % (FLUSH) 0.9 %
5-10 SYRINGE (ML) INJECTION AS NEEDED
Status: DISCONTINUED | OUTPATIENT
Start: 2018-10-14 | End: 2018-10-14 | Stop reason: HOSPADM

## 2018-10-14 RX ORDER — LIDOCAINE HYDROCHLORIDE 20 MG/ML
JELLY TOPICAL
Status: DISCONTINUED | OUTPATIENT
Start: 2018-10-14 | End: 2018-10-14 | Stop reason: HOSPADM

## 2018-10-14 RX ORDER — KETOROLAC TROMETHAMINE 30 MG/ML
INJECTION, SOLUTION INTRAMUSCULAR; INTRAVENOUS AS NEEDED
Status: DISCONTINUED | OUTPATIENT
Start: 2018-10-14 | End: 2018-10-14 | Stop reason: HOSPADM

## 2018-10-14 RX ORDER — OXYCODONE HYDROCHLORIDE 5 MG/1
10 TABLET ORAL
Status: DISPENSED | OUTPATIENT
Start: 2018-10-14 | End: 2018-10-15

## 2018-10-14 RX ORDER — DEXAMETHASONE SODIUM PHOSPHATE 4 MG/ML
INJECTION, SOLUTION INTRA-ARTICULAR; INTRALESIONAL; INTRAMUSCULAR; INTRAVENOUS; SOFT TISSUE AS NEEDED
Status: DISCONTINUED | OUTPATIENT
Start: 2018-10-14 | End: 2018-10-14 | Stop reason: HOSPADM

## 2018-10-14 RX ORDER — OXYTOCIN/0.9 % SODIUM CHLORIDE 15/250 ML
250 PLASTIC BAG, INJECTION (ML) INTRAVENOUS ONCE
Status: DISCONTINUED | OUTPATIENT
Start: 2018-10-14 | End: 2018-10-14 | Stop reason: HOSPADM

## 2018-10-14 RX ORDER — CEFAZOLIN SODIUM/WATER 2 G/20 ML
2 SYRINGE (ML) INTRAVENOUS
Status: COMPLETED | OUTPATIENT
Start: 2018-10-14 | End: 2018-10-14

## 2018-10-14 RX ADMIN — EPHEDRINE SULFATE 5 MG: 50 INJECTION, SOLUTION INTRAVENOUS at 19:29

## 2018-10-14 RX ADMIN — DEXAMETHASONE SODIUM PHOSPHATE 8 MG: 4 INJECTION, SOLUTION INTRA-ARTICULAR; INTRALESIONAL; INTRAMUSCULAR; INTRAVENOUS; SOFT TISSUE at 19:49

## 2018-10-14 RX ADMIN — OXYCODONE HYDROCHLORIDE 10 MG: 5 TABLET ORAL at 22:45

## 2018-10-14 RX ADMIN — KETOROLAC TROMETHAMINE 30 MG: 30 INJECTION, SOLUTION INTRAMUSCULAR; INTRAVENOUS at 19:51

## 2018-10-14 RX ADMIN — MORPHINE SULFATE 2.8 MG: 10 INJECTION, SOLUTION INTRAMUSCULAR; INTRAVENOUS at 19:42

## 2018-10-14 RX ADMIN — MORPHINE SULFATE 1 MG: 10 INJECTION, SOLUTION INTRAMUSCULAR; INTRAVENOUS at 19:39

## 2018-10-14 RX ADMIN — SODIUM CHLORIDE, SODIUM LACTATE, POTASSIUM CHLORIDE, CALCIUM CHLORIDE: 600; 310; 30; 20 INJECTION, SOLUTION INTRAVENOUS at 19:14

## 2018-10-14 RX ADMIN — Medication 250 ML/HR: at 19:36

## 2018-10-14 RX ADMIN — Medication 2 G: at 18:44

## 2018-10-14 RX ADMIN — FENTANYL CITRATE 50 MCG: 50 INJECTION, SOLUTION INTRAMUSCULAR; INTRAVENOUS at 19:50

## 2018-10-14 RX ADMIN — FENTANYL CITRATE 50 MCG: 50 INJECTION, SOLUTION INTRAMUSCULAR; INTRAVENOUS at 19:44

## 2018-10-14 RX ADMIN — EPHEDRINE SULFATE 10 MG: 50 INJECTION, SOLUTION INTRAVENOUS at 19:24

## 2018-10-14 RX ADMIN — SODIUM CHLORIDE, SODIUM LACTATE, POTASSIUM CHLORIDE, CALCIUM CHLORIDE: 600; 310; 30; 20 INJECTION, SOLUTION INTRAVENOUS at 19:24

## 2018-10-14 RX ADMIN — SODIUM CITRATE AND CITRIC ACID MONOHYDRATE 30 ML: 500; 334 SOLUTION ORAL at 18:45

## 2018-10-14 RX ADMIN — AZITHROMYCIN MONOHYDRATE 500 MG: 500 INJECTION, POWDER, LYOPHILIZED, FOR SOLUTION INTRAVENOUS at 18:44

## 2018-10-14 RX ADMIN — ONDANSETRON 4 MG: 2 INJECTION INTRAMUSCULAR; INTRAVENOUS at 19:37

## 2018-10-14 RX ADMIN — MORPHINE SULFATE 1 MG: 10 INJECTION, SOLUTION INTRAMUSCULAR; INTRAVENOUS at 19:38

## 2018-10-14 RX ADMIN — KETOROLAC TROMETHAMINE 30 MG: 30 INJECTION, SOLUTION INTRAMUSCULAR at 22:45

## 2018-10-14 RX ADMIN — CEFAZOLIN 1 G: 1 INJECTION, POWDER, FOR SOLUTION INTRAMUSCULAR; INTRAVENOUS at 22:46

## 2018-10-14 NOTE — H&P
History & Physical 
 
Name: Miquel De La Rosa MRN: [id number]  SSN: [id number] YOB: 1995  Age: 21 y.o. Sex: female Subjective:  
 
Reason for Admission:  Pregnancy and fetal decels History of Present Illness: Ms. Karin Gibbs is a 21 y. o. female with an estimated gestational age of 38w3d with Estimated Date of Delivery: 10/18/18. Patient complains of mild contractions and decreased fetal movement since this am. Pregnancy has been complicated by mild fetal growth restriction. Recent ultrasound showed baby at 18th %ile and AC at 13&ile with wu of 14. Pt was scheduled for induction on Wednesday. Patient denies abdominal pain  , headache , nausea and vomiting, swelling, vaginal bleeding  and vaginal leaking of fluid . OB History  Para Term  AB Living 2 1 1   1 SAB TAB Ectopic Molar Multiple Live Births 1 # Outcome Date GA Lbr Chris/2nd Weight Sex Delivery Anes PTL Lv  
2 Current 1 Term 16 41w4d  3.374 kg M Vag-Spont EPI N HAIM Past Medical History:  
Diagnosis Date  Allergic rhinitis due to pollen  Asthma  Attention deficit disorder without mention of hyperactivity  Chronic tension headache  Contact dermatitis and other eczema, due to unspecified cause  Depressive disorder, not elsewhere classified  Dizziness and giddiness  Folliculitis  Herpetic gingivostomatitis  Hypothyroidism  Irritability  Knee pain  Low back pain  Myalgia and myositis, unspecified  Nausea alone  Other kyphoscoliosis and scoliosis  Personal history of allergy to penicillin  Raynaud's syndrome  Reflux Past Surgical History:  
Procedure Laterality Date  HX HEENT  2012  
 sinus  HX TONSIL AND ADENOIDECTOMY  INSERT PLEURAL CATHETER    
 drain fluid off lung age 11-9 Social History Occupational History  Not on file. Social History Main Topics  Smoking status: Never Smoker  Smokeless tobacco: Never Used  Alcohol use No  
 Drug use: No  
 Sexual activity: Yes  
  Partners: Male Birth control/ protection: None Family History Problem Relation Age of Onset  Attention Deficit Hyperactivity Disorder Mother  No Known Problems Sister  No Known Problems Brother  No Known Problems Brother  Coronary Artery Disease Maternal Grandmother  Diabetes Maternal Grandmother Allergies Allergen Reactions  Egg Anaphylaxis  Influenza Virus Vaccine, Specific Anaphylaxis  Peanut Anaphylaxis  Adderall [Dextroamphetamine-Amphetamine] Other (comments) HEADACHE  Augmentin [Amoxicillin-Pot Clavulanate] Rash  Penicillins Rash Prior to Admission medications Medication Sig Start Date End Date Taking? Authorizing Provider Cetirizine (ZYRTEC) 10 mg cap Take  by mouth. Yes Historical Provider  
levothyroxine (SYNTHROID) 25 mcg tablet Take 1 Tab by mouth Daily (before breakfast). 5/8/18  Yes Buster Argueta PA-C  
mometasone-formoterol (DULERA) 200-5 mcg/actuation HFA inhaler Take 2 Puffs by inhalation two (2) times a day. 5/8/18  Yes Buster Argueta PA-C  
montelukast (SINGULAIR) 10 mg tablet Take 1 Tab by mouth daily. 5/8/18  Yes Buster Argueta PA-C  
PNV KD.54/[id number] FUM/FOLIC AC (PRENATAL PO) Take  by mouth. Yes Historical Provider  
ferrous sulfate 324 mg (65 mg iron) tablet Take  by mouth Daily (before breakfast). Yes Historical Provider  
albuterol [facility] [place][note 2] HFA) 90 mcg/actuation inhaler 1-2 puffs q 4-6 hours prn wheezing 5/8/18   Buster Argueta PA-C Review of Systems: A comprehensive review of systems was negative except for that written in the History of Present Illness. Objective:  
 
Vitals:   
Vitals:  
 10/14/18 1416 10/14/18 1417 10/14/18 1419 10/14/18 1506 BP:  104/76 104/76 112/73 Pulse:  100 100 78 Resp:      
Temp:      
Weight: 67.6 kg (149 lb) Height: 5' 1\" (1.549 m) Temp (24hrs), Av °F (36.7 °C), Min:98 °F (36.7 °C), Max:98 °F (36.7 °C) BP  Min: 104/76  Max: 112/73 Physical Exam: 
Patient without distress. Heart: Regular rate and rhythm Lung: clear to auscultation throughout lung fields, no wheezes, no rales, no rhonchi and normal respiratory effort Back: costovertebral angle tenderness absent Abdomen: soft, nontender Fundus: soft and non tender Perineum: blood absent, amniotic fluid absent Cervical Exam: 3 cm dilated 60% effaced   
-2 station Presenting Part: cephalic Lower Extremities:  - Edema No 
 - Patellar Reflexes: 2+ bilaterally ; abrasions on legs bilat- hx eczema Membranes:  Intact Uterine Activity:  occasional  
Fetal Heart Rate:  Baseline: 130 per minute Variability: moderate Accelerations: no 
Decelerations: variable  - pt had 3 decels within first 15 minutes in triage without obvious contractions- moved to L&D and IV started Bedside ultrasound- vertex, grade 3 anterior placenta, wu = 4 Lab/Data Review: 
Recent Results (from the past 24 hour(s)) DRUG SCREEN, URINE Collection Time: 10/14/18  2:44 PM  
Result Value Ref Range PCP(PHENCYCLIDINE) NEGATIVE BENZODIAZEPINES NEGATIVE     
 COCAINE NEGATIVE AMPHETAMINES NEGATIVE METHADONE NEGATIVE     
 THC (TH-CANNABINOL) NEGATIVE     
 OPIATES NEGATIVE     
 BARBITURATES NEGATIVE     
CBC W/O DIFF Collection Time: 10/14/18  2:45 PM  
Result Value Ref Range WBC 11.6 (H) 4.3 - 11.1 K/uL  
 RBC 4.17 4.05 - 5.2 M/uL  
 HGB 13.1 11.7 - 15.4 g/dL HCT 39.8 35.8 - 46.3 % MCV 95.4 79.6 - 97.8 FL  
 MCH 31.4 26.1 - 32.9 PG  
 MCHC 32.9 31.4 - 35.0 g/dL  
 RDW 11.9 % PLATELET 782 827 - 023 K/uL MPV 11.1 9.4 - 12.3 FL ABSOLUTE NRBC 0.00 0.0 - 0.2 K/uL  
TYPE & SCREEN Collection Time: 10/14/18  2:45 PM  
Result Value Ref Range Crossmatch Expiration 10/17/2018 ABO/Rh(D) O POSITIVE Antibody screen NEG METABOLIC PANEL, COMPREHENSIVE Collection Time: 10/14/18  3:44 PM  
Result Value Ref Range Sodium 139 136 - 145 mmol/L Potassium 3.8 3.5 - 5.1 mmol/L Chloride 106 98 - 107 mmol/L  
 CO2 26 21 - 32 mmol/L Anion gap 7 mmol/L Glucose 86 65 - 100 mg/dL BUN 7 6 - 23 MG/DL Creatinine 0.64 0.6 - 1.0 MG/DL  
 GFR est AA >60 >60 ml/min/1.73m2 GFR est non-AA >60 ml/min/1.73m2 Calcium 8.7 8.3 - 10.4 MG/DL Bilirubin, total 0.6 0.2 - 1.1 MG/DL  
 ALT (SGPT) 14 12 - 65 U/L  
 AST (SGOT) 12 (L) 15 - 37 U/L Alk. phosphatase 109 50 - 136 U/L Protein, total 6.4 g/dL Albumin 2.8 (L) 3.5 - 5.0 g/dL Globulin 3.6 (H) 2.3 - 3.5 g/dL A-G Ratio 0.8 URIC ACID Collection Time: 10/14/18  3:44 PM  
Result Value Ref Range Uric acid 4.4 2.6 - 6.0 MG/DL Assessment and Plan: Active Problems: 
  Abdominal pain during pregnancy in third trimester (10/14/2018) Normal labor (10/14/2018) Variable fetal heart rate decelerations, antepartum (10/14/2018) IUGR (intrauterine growth restriction) affecting care of mother, third trimester, fetus 3 (10/14/2018) - Oligohydramnios:  fetal decels Iv hydrate, consider  if decels do not resolve- if reassuring- plan for vag delivery Pt presented with mild contractions and decreased fetal movement. Immediately upon admission to triage , pt was noted to have 3 decels. Pt was moved to L&D and iv started for fluid bolus. Decels resolved and pt even began having accels. Pregnancy with mild iugr with growth at 18%ile. Noted to lower wu today. Will admit and monitor- c-sections if decels persist, vag delivery if tracing is reassuring. Consider augmentation if needed. Signed By:  Antoinette Corona MD   
 2018

## 2018-10-14 NOTE — PROGRESS NOTES
Labor Progress Note Patient seen, fetal heart rate and contraction pattern evaluated, patient examined. Patient admitted because of variables and lates noted in triage Patient Vitals for the past 1 hrs: 
 BP Pulse 10/14/18 1739 107/55 77 Physical Exam: 
Cervical Exam:  3 cm dilated Membranes:  Intact Uterine Activity: Frequency: Every 6-8 minutes and Intensity: mild Fetal Heart Rate: subtle lates noted Assessment/Plan: 
Proceed with  Section Nonreassuring fetal status with labor not progressing normally. Recommended proceeding with  delivery. Risks of bleeding, infection, bladder and bowel damage explained to patient's family. They understand the situation and consent to the  delivery.

## 2018-10-14 NOTE — ANESTHESIA PROCEDURE NOTES
Spinal Block Start time: 10/14/2018 7:18 PM 
End time: 10/14/2018 7:21 PM 
Performed by: Sonia Cain Authorized by: Sonia Cain  
 
Pre-procedure: Indications: primary anesthetic  Preanesthetic Checklist: patient identified, risks and benefits discussed, anesthesia consent, site marked, patient being monitored and timeout performed Timeout Time: 19:18 Spinal Block:  
Patient Position:  Seated Prep: chlorhexidine and patient draped Location:  L2-3 Technique:  Single shot Needle:  
Needle Type:  Pencan Needle Gauge:  25 G Attempts:  1 Events: CSF confirmed, no blood with aspiration and no paresthesia Assessment: 
Insertion:  Uncomplicated Patient tolerance:  Patient tolerated the procedure well with no immediate complications 9 mgs hyperbaric bupivacaine with 0.2 mgs preserv-free MS04

## 2018-10-14 NOTE — PROGRESS NOTES
Birth of viable baby boy. APGARs 8/9. matthew Beck at delivery. See MD notes for initial infant assessment.

## 2018-10-14 NOTE — PROGRESS NOTES
Pt had prolonged deceleration that lasted 3 1/2 mins down in the 100's. IVF bolus started, pt turned to right lateral, oxygen 10L via mask.

## 2018-10-14 NOTE — ANESTHESIA PREPROCEDURE EVALUATION
Anesthetic History No history of anesthetic complications Review of Systems / Medical History Patient summary reviewed and pertinent labs reviewed Pulmonary Asthma Neuro/Psych Psychiatric history Comments: ADD/depression Cardiovascular Exercise tolerance: >4 METS 
  
GI/Hepatic/Renal 
Within defined limits Endo/Other Hypothyroidism: well controlled Other Findings Comments: Egg allergy 
kyphosis and scoliosis Physical Exam 
 
Airway Mallampati: III 
TM Distance: 4 - 6 cm Neck ROM: normal range of motion Mouth opening: Diminished (comment) Cardiovascular Rhythm: regular Dental 
No notable dental hx Pulmonary Abdominal 
GI exam deferred Other Findings Anesthetic Plan ASA: 2, emergent Anesthesia type: spinal 
 
 
 
 
 
Anesthetic plan and risks discussed with: Patient

## 2018-10-15 LAB
BASOPHILS # BLD: 0 K/UL (ref 0–0.2)
BASOPHILS NFR BLD: 0 % (ref 0–2)
DIFFERENTIAL METHOD BLD: ABNORMAL
EOSINOPHIL # BLD: 0 K/UL (ref 0–0.8)
EOSINOPHIL NFR BLD: 0 % (ref 0.5–7.8)
ERYTHROCYTE [DISTWIDTH] IN BLOOD BY AUTOMATED COUNT: 11.9 %
HCT VFR BLD AUTO: 36.7 % (ref 35.8–46.3)
HGB BLD-MCNC: 11.9 G/DL (ref 11.7–15.4)
IMM GRANULOCYTES # BLD: 0.1 K/UL (ref 0–0.5)
IMM GRANULOCYTES NFR BLD AUTO: 0 % (ref 0–5)
LYMPHOCYTES # BLD: 0.9 K/UL (ref 0.5–4.6)
LYMPHOCYTES NFR BLD: 6 % (ref 13–44)
MCH RBC QN AUTO: 31.9 PG (ref 26.1–32.9)
MCHC RBC AUTO-ENTMCNC: 32.4 G/DL (ref 31.4–35)
MCV RBC AUTO: 98.4 FL (ref 79.6–97.8)
MONOCYTES # BLD: 0.5 K/UL (ref 0.1–1.3)
MONOCYTES NFR BLD: 3 % (ref 4–12)
NEUTS SEG # BLD: 14.2 K/UL (ref 1.7–8.2)
NEUTS SEG NFR BLD: 90 % (ref 43–78)
NRBC # BLD: 0 K/UL (ref 0–0.2)
PLATELET # BLD AUTO: 196 K/UL (ref 150–450)
PMV BLD AUTO: 11.8 FL (ref 9.4–12.3)
RBC # BLD AUTO: 3.73 M/UL (ref 4.05–5.2)
WBC # BLD AUTO: 15.7 K/UL (ref 4.3–11.1)

## 2018-10-15 PROCEDURE — 74011250636 HC RX REV CODE- 250/636: Performed by: ANESTHESIOLOGY

## 2018-10-15 PROCEDURE — 74011250637 HC RX REV CODE- 250/637: Performed by: ANESTHESIOLOGY

## 2018-10-15 PROCEDURE — 36415 COLL VENOUS BLD VENIPUNCTURE: CPT

## 2018-10-15 PROCEDURE — 65270000029 HC RM PRIVATE

## 2018-10-15 PROCEDURE — 74011250636 HC RX REV CODE- 250/636: Performed by: OBSTETRICS & GYNECOLOGY

## 2018-10-15 PROCEDURE — 87340 HEPATITIS B SURFACE AG IA: CPT

## 2018-10-15 PROCEDURE — 74011000258 HC RX REV CODE- 258: Performed by: OBSTETRICS & GYNECOLOGY

## 2018-10-15 PROCEDURE — 85025 COMPLETE CBC W/AUTO DIFF WBC: CPT

## 2018-10-15 PROCEDURE — 74011250637 HC RX REV CODE- 250/637: Performed by: OBSTETRICS & GYNECOLOGY

## 2018-10-15 RX ORDER — SODIUM CHLORIDE 0.9 % (FLUSH) 0.9 %
5-10 SYRINGE (ML) INJECTION AS NEEDED
Status: DISCONTINUED | OUTPATIENT
Start: 2018-10-15 | End: 2018-10-17 | Stop reason: ALTCHOICE

## 2018-10-15 RX ORDER — IBUPROFEN 800 MG/1
800 TABLET ORAL
Status: DISCONTINUED | OUTPATIENT
Start: 2018-10-15 | End: 2018-10-17 | Stop reason: HOSPADM

## 2018-10-15 RX ORDER — DIPHENHYDRAMINE HCL 25 MG
25 CAPSULE ORAL
Status: DISCONTINUED | OUTPATIENT
Start: 2018-10-15 | End: 2018-10-17 | Stop reason: HOSPADM

## 2018-10-15 RX ORDER — OXYCODONE AND ACETAMINOPHEN 7.5; 325 MG/1; MG/1
1 TABLET ORAL
Status: DISCONTINUED | OUTPATIENT
Start: 2018-10-15 | End: 2018-10-17 | Stop reason: HOSPADM

## 2018-10-15 RX ORDER — SIMETHICONE 80 MG
80 TABLET,CHEWABLE ORAL
Status: DISCONTINUED | OUTPATIENT
Start: 2018-10-15 | End: 2018-10-17 | Stop reason: HOSPADM

## 2018-10-15 RX ORDER — AMOXICILLIN 250 MG
2 CAPSULE ORAL DAILY
Status: DISCONTINUED | OUTPATIENT
Start: 2018-10-16 | End: 2018-10-17 | Stop reason: HOSPADM

## 2018-10-15 RX ORDER — PRENATAL VIT 96/IRON FUM/FOLIC 27MG-0.8MG
1 TABLET ORAL DAILY
Status: DISCONTINUED | OUTPATIENT
Start: 2018-10-16 | End: 2018-10-17 | Stop reason: HOSPADM

## 2018-10-15 RX ORDER — SODIUM CHLORIDE 0.9 % (FLUSH) 0.9 %
5-10 SYRINGE (ML) INJECTION EVERY 8 HOURS
Status: DISCONTINUED | OUTPATIENT
Start: 2018-10-15 | End: 2018-10-17 | Stop reason: ALTCHOICE

## 2018-10-15 RX ORDER — ONDANSETRON 8 MG/1
8 TABLET, ORALLY DISINTEGRATING ORAL
Status: DISCONTINUED | OUTPATIENT
Start: 2018-10-15 | End: 2018-10-17 | Stop reason: HOSPADM

## 2018-10-15 RX ORDER — OXYCODONE AND ACETAMINOPHEN 7.5; 325 MG/1; MG/1
2 TABLET ORAL
Status: DISCONTINUED | OUTPATIENT
Start: 2018-10-15 | End: 2018-10-17 | Stop reason: HOSPADM

## 2018-10-15 RX ORDER — NALOXONE HYDROCHLORIDE 0.4 MG/ML
0.4 INJECTION, SOLUTION INTRAMUSCULAR; INTRAVENOUS; SUBCUTANEOUS AS NEEDED
Status: DISCONTINUED | OUTPATIENT
Start: 2018-10-15 | End: 2018-10-17 | Stop reason: HOSPADM

## 2018-10-15 RX ADMIN — KETOROLAC TROMETHAMINE 30 MG: 30 INJECTION, SOLUTION INTRAMUSCULAR at 04:18

## 2018-10-15 RX ADMIN — LEVOTHYROXINE SODIUM 25 MCG: 50 TABLET ORAL at 22:05

## 2018-10-15 RX ADMIN — KETOROLAC TROMETHAMINE 30 MG: 30 INJECTION, SOLUTION INTRAMUSCULAR at 10:15

## 2018-10-15 RX ADMIN — SIMETHICONE CHEW TAB 80 MG 80 MG: 80 TABLET ORAL at 22:05

## 2018-10-15 RX ADMIN — CEFAZOLIN 1 G: 1 INJECTION, POWDER, FOR SOLUTION INTRAMUSCULAR; INTRAVENOUS at 04:20

## 2018-10-15 RX ADMIN — IBUPROFEN 800 MG: 800 TABLET ORAL at 22:05

## 2018-10-15 RX ADMIN — ONDANSETRON 4 MG: 2 INJECTION INTRAMUSCULAR; INTRAVENOUS at 00:38

## 2018-10-15 RX ADMIN — KETOROLAC TROMETHAMINE 30 MG: 30 INJECTION, SOLUTION INTRAMUSCULAR at 16:05

## 2018-10-15 RX ADMIN — CEFAZOLIN 1 G: 1 INJECTION, POWDER, FOR SOLUTION INTRAMUSCULAR; INTRAVENOUS at 10:16

## 2018-10-15 RX ADMIN — Medication 10 ML: at 16:06

## 2018-10-15 NOTE — PROGRESS NOTES
Pt is S/P primary  at 39 3/7 weeks due to NR-FHTs. No complaints today. Normal PO pain. No GI/ issues. Lochia < menses. No F/C. Visit Vitals  /58 (BP 1 Location: Left arm, BP Patient Position: At rest)  Pulse 63  Temp 98 °F (36.7 °C)  Resp 18  Ht 5' 1\" (1.549 m)  Wt 149 lb (67.6 kg)  SpO2 96%  Breastfeeding Unknown  BMI 28.15 kg/m2 CV - RRR 
LUNGS - CTA bilaterally ABD - soft, appropriate tenderness, incision C/D/I 
EXT - tr edema bilaterally Labs:   
Recent Results (from the past 24 hour(s)) DRUG SCREEN, URINE Collection Time: 10/14/18  2:44 PM  
Result Value Ref Range PCP(PHENCYCLIDINE) NEGATIVE BENZODIAZEPINES NEGATIVE     
 COCAINE NEGATIVE AMPHETAMINES NEGATIVE METHADONE NEGATIVE     
 THC (TH-CANNABINOL) NEGATIVE     
 OPIATES NEGATIVE     
 BARBITURATES NEGATIVE     
CBC W/O DIFF Collection Time: 10/14/18  2:45 PM  
Result Value Ref Range WBC 11.6 (H) 4.3 - 11.1 K/uL  
 RBC 4.17 4.05 - 5.2 M/uL  
 HGB 13.1 11.7 - 15.4 g/dL HCT 39.8 35.8 - 46.3 % MCV 95.4 79.6 - 97.8 FL  
 MCH 31.4 26.1 - 32.9 PG  
 MCHC 32.9 31.4 - 35.0 g/dL  
 RDW 11.9 % PLATELET 457 047 - 594 K/uL MPV 11.1 9.4 - 12.3 FL ABSOLUTE NRBC 0.00 0.0 - 0.2 K/uL  
TYPE & SCREEN Collection Time: 10/14/18  2:45 PM  
Result Value Ref Range Crossmatch Expiration 10/17/2018 ABO/Rh(D) O POSITIVE Antibody screen NEG METABOLIC PANEL, COMPREHENSIVE Collection Time: 10/14/18  3:44 PM  
Result Value Ref Range Sodium 139 136 - 145 mmol/L Potassium 3.8 3.5 - 5.1 mmol/L Chloride 106 98 - 107 mmol/L  
 CO2 26 21 - 32 mmol/L Anion gap 7 mmol/L Glucose 86 65 - 100 mg/dL BUN 7 6 - 23 MG/DL Creatinine 0.64 0.6 - 1.0 MG/DL  
 GFR est AA >60 >60 ml/min/1.73m2 GFR est non-AA >60 ml/min/1.73m2 Calcium 8.7 8.3 - 10.4 MG/DL  Bilirubin, total 0.6 0.2 - 1.1 MG/DL  
 ALT (SGPT) 14 12 - 65 U/L  
 AST (SGOT) 12 (L) 15 - 37 U/L  
 Alk. phosphatase 109 50 - 136 U/L Protein, total 6.4 g/dL Albumin 2.8 (L) 3.5 - 5.0 g/dL Globulin 3.6 (H) 2.3 - 3.5 g/dL A-G Ratio 0.8 URIC ACID Collection Time: 10/14/18  3:44 PM  
Result Value Ref Range Uric acid 4.4 2.6 - 6.0 MG/DL  
BLOOD GAS, CORD BLOOD Collection Time: 10/14/18  7:47 PM  
Result Value Ref Range Device: ROOM AIR    
 pH, cord blood (POC) 7.325 7. 15 - 7.38    
 pCO2 cord blood 59 32 - 68 mmHg  
 pO2 cord blood 11 mmHg HCO3 (POC) 31.0 (H) 22 - 26 MMOL/L  
 sO2 (POC) 10 (L) 95 - 98 % Base excess (POC) 3 mmol/L Allens test (POC) NOT APPLICABLE Site CORD Patient temp. 98.6 Specimen type (POC) ARTERIAL CORD Performed by [id number], CORD BLOOD Collection Time: 10/14/18  7:51 PM  
Result Value Ref Range Device: OTHER    
 pH, cord blood (POC) 7.334 7.15 - 7.38    
 pCO2 cord blood 45 32 - 68 mmHg  
 pO2 cord blood 20 mmHg Base deficit (POC) 2 mmol/L Allens test (POC) NOT APPLICABLE Site CORD Patient temp. 98.6 Specimen type (POC) VENOUS CORD Performed by [place]   
CBC WITH AUTOMATED DIFF Collection Time: 10/15/18  6:27 AM  
Result Value Ref Range WBC 15.7 (H) 4.3 - 11.1 K/uL  
 RBC 3.73 (L) 4.05 - 5.2 M/uL  
 HGB 11.9 11.7 - 15.4 g/dL HCT 36.7 35.8 - 46.3 % MCV 98.4 (H) 79.6 - 97.8 FL  
 MCH 31.9 26.1 - 32.9 PG  
 MCHC 32.4 31.4 - 35.0 g/dL  
 RDW 11.9 % PLATELET 478 246 - 139 K/uL MPV 11.8 9.4 - 12.3 FL ABSOLUTE NRBC 0.00 0.0 - 0.2 K/uL  
 DF AUTOMATED NEUTROPHILS 90 (H) 43 - 78 % LYMPHOCYTES 6 (L) 13 - 44 % MONOCYTES 3 (L) 4.0 - 12.0 % EOSINOPHILS 0 (L) 0.5 - 7.8 % BASOPHILS 0 0.0 - 2.0 % IMMATURE GRANULOCYTES 0 0.0 - 5.0 %  
 ABS. NEUTROPHILS 14.2 (H) 1.7 - 8.2 K/UL  
 ABS. LYMPHOCYTES 0.9 0.5 - 4.6 K/UL  
 ABS. MONOCYTES 0.5 0.1 - 1.3 K/UL  
 ABS. EOSINOPHILS 0.0 0.0 - 0.8 K/UL  
 ABS. BASOPHILS 0.0 0.0 - 0.2 K/UL  
 ABS. IMM. GRANS. 0.1 0.0 - 0.5 K/UL  
 
 
POD #1 LTCS Pt is breast feeding and plans on P4 method for birth control. Stable, cont. routine PP/PO care Odin Gagnon MD 
1:22 PM 
10/15/18

## 2018-10-15 NOTE — PROGRESS NOTES
Report received from [facility], RN. Patient care assumed-bedside reporting completed.

## 2018-10-15 NOTE — L&D DELIVERY NOTE
Delivery Summary    Patient: Ariana Maya MRN: [id number]  SSN: [id number]    YOB: 1995  Age: 21 y.o. Sex: female        Information for the patient's :  Sierra Face [[id number]]       Labor Events:    Labor: No   Rupture Date:     Rupture Time:     Rupture Type Intact   Amniotic Fluid Volume: Scant    Amniotic Fluid Description:       Induction: None       Augmentation: None   Labor Events: Fetal Intolerance; Other (comment)     Cervical Ripening:     None     Delivery Events:  Episiotomy:     Laceration(s):       Repaired:      Number of Repair Packets:     Suture Type and Size:       Estimated Blood Loss (ml): 800.00ml       Delivery Date: 10/14/2018    Delivery Time: 7:35 PM  Delivery Type: , Low Transverse   Details    Trial of Labor: Yes   Primary/Repeat: Primary   Priority: Emergency   Indications:  Fetal Intolerance of Labor   Incision type:     Sex:  Male     Gestational Age: 38w3d  Delivery Clinician:  Elsa Thomas  Living Status: Living   Delivery Location: OR OR 2          APGARS  One minute Five minutes Ten minutes   Skin color: 0   1        Heart rate: 2   2        Grimace: 2   2        Muscle tone: 2   2        Breathin   2        Totals: 8   9          Presentation: Vertex    Position:        Resuscitation Method:  Suctioning-bulb; Tactile Stimulation; Other (Comment) scant fluid   Meconium Stained: Thin      Cord Vessels: 3 Vessels      Cord Events:    Cord Blood Sent?:  Yes    Blood Gases Sent?:  Yes    Placenta:  Date/Time: 10/14  7:36 PM  Removal: Expressed      Appearance: Intact; Other (comment)     [place] Measurements:  Birth Weight: 6 lb 8.6 oz (2.965 kg)      Birth Length: 50 cm      Head Circumference: 33 cm      Chest Circumference: 32.5 cm     Abdominal Girth:       Other Providers:   ELSA THOMAS;MAYELIN CUNNINGHAM;EVER SUNSHINE;JENI WHITMAN;JOSH HERRERA;JOSE MCKAY;JENNIFER SPARKS;SHAN TIRADO CLEMENT HARVEY, Obstetrician;Primary Nurse;Primary  Nurse;Respiratory Therapist;Neonatologist;Anesthesiologist;Crna;Scrub Tech;Scrub Tech             Group B Strep: Negative 2018

## 2018-10-15 NOTE — LACTATION NOTE
In to see mom and infant for the first time. Mom and dad state that infant is latching and nursing well. Reviewed with mom and dad the second night of life and answered. Also reviewed positioning of infant to get and maintain a good latch. Lactation consultant will follow up tomorrow.

## 2018-10-15 NOTE — PROGRESS NOTES
SBAR OUT Report: Mother Verbal report given to ALEXANDRU Villasenor (full name & credentials) on this patient, who is now being transferred to MIU (unit) for routine progression of care. The patient is wearing a green \"Anesthesia-Duramorph\" band. Report consisted of patient's Situation, Background, Assessment and Recommendations (SBAR). Fleming ID bands were compared with the identification form, and verified with the patient and receiving nurse. Information from the SBAR, OR Summary, Procedure Summary, Intake/Output, MAR and Recent Results and the Courtney Report was reviewed with the receiving nurse; opportunity for questions and clarification provided. Fundal Check with oncoming RN.

## 2018-10-15 NOTE — OP NOTES
Section Delivery Procedure Note         Name: Tulio France      Medical Record Number: [id number]      YOB: 1995     Today's Date: 2018      Preoperative Diagnosis: intrauterine pregnancy at term; fetal intolerance of labor; oligohydramnios    Postoperative Diagnosis: Same    Procedure: Low Cervical Transverse    SECTION    Surgeon(s):  Saad Sevilla MD    Anesthesia:  spinal    Prophylactic Antibiotics: Ancef 2 gm    EBL: 800 ml         Fetal Description: soler male    Birth Information: Information for the patient's :  Gabi Miss [[id number]]   One Minute Apgar: 8 (Filed from Delivery Summary)  Five  Minute Apgar: 9 (Filed from Delivery Summary)      Umbilical Cord: 3 vessels present and Cord blood sent to lab for type, Rh, and Nikunj' test    Placenta:  Expressed  intact    Specimens:   ID Type Source Tests Collected by Time Destination   1 :  Placenta   Donovan Menon MD 10/14/2018 1939 Discarded               Complications:  fetal intolerance of labor; oligohydramnios    Procedure Details: The patient was taken to the operating room, where spinal anesthesia was administered and found to be adequate. Nelson catheter had been placed using sterile technique. The patient was prepped and draped in the normal sterile fashion. The abdomen was entered using the Pfannenstiel technique. The peritoneum was entered sharply well superior to the bladder. The bladder blade was then inserted. The vesicouterine and peritoneum was identified and entered sharply with Metzenbaum scissors. The bladder flap was then created sharply and the bladder blade was reinserted. A low transverse uterine incision was made with the scalpel and extended laterally with blunt finger dissection. Amniotomy was performed and the fluid was scant thin meconium. The babys head was then delivered atraumatically. The nose and mouth were suctioned.  The cord was clamped and cut and the baby was handed off to the waiting  care unit staff. Placenta was then expressed from the uterus. The uterus was exteriorized and cleared of all clots and debris. The uterine incision was closed with number 1 Chromic in running locking fashion followed by an imbricating stitch with good hemostasis assured. The posterior cul-de-sac was irrigated with warm normal saline. Good hemostasis was again reassured and the uterus was returned to the abdomen. The anterior pelvis was irrigated with warm normal saline and good hemostasis was again reassured throughout. The anterior peritoneum was closed with 3-0 Vicryl. The fascia was closed with 0 Vicrly in a running fashion. Good hemostasis was assured. The subcuticular layers were reapproximated with 2-0 plain gut. The skin was closed with a 4-0 Vicryl in a subcuticular fashion. Steri-strips were applied. The patient tolerated the procedure well. Sponge, lap, and needle counts were correct times three and the patient and baby were taken to recovery/postpartum room in stable condition.     Dolores Pierre MD      2018

## 2018-10-15 NOTE — PROGRESS NOTES
SBAR IN Report: Mother Verbal report received from [facility] (full name & credentials) on this patient, who is now being transferred from Labor and Delivery (unit) for routine progression of care. The patient is wearing a green \"Anesthesia-Duramorph\" band. Report consisted of patient's Situation, Background, Assessment and Recommendations (SBAR).  ID bands were compared with the identification form, and verified with the patient and transferring nurse. Information from the SBAR and the [place]legate Report was reviewed with the transferring nurse; opportunity for questions and clarification provided.

## 2018-10-15 NOTE — PROGRESS NOTES
OOB to restroom after discontinued linares. Pericare taught. Tolerated ambulation well. Linens and gown changed. Patient unable to void at this time. Instructed to call nurse for the next two measured voids. Patient demonstrates understanding of saving urinary output in measured specimen hat.

## 2018-10-15 NOTE — PROGRESS NOTES
Initial visit made with patient and . Meal tickets given per patient relations request. 
Alida Mora M.Div.

## 2018-10-15 NOTE — PROGRESS NOTES
Dr. Brian Godinez notified that pt takes 25mcg of synthroid every PM. Orders for 25 mcg synthroid given by MD.

## 2018-10-15 NOTE — PROGRESS NOTES
Post-op Day 1 S/P L/T C/S Via SAB. Post-op analgesia adequate via intrathecal narcotics and available parenteral/po narcotics and NSAID's.

## 2018-10-15 NOTE — ANESTHESIA POSTPROCEDURE EVALUATION
Post-Anesthesia Evaluation and Assessment Patient: Michelle Mead MRN: [id number]  SSN: [id number] YOB: 1995  Age: 21 y.o. Sex: female Cardiovascular Function/Vital Signs Visit Vitals  /71 (BP 1 Location: Left arm)  Pulse 66  Temp 36.4 °C (97.5 °F)  Resp 18  Ht 5' 1\" (1.549 m)  Wt 67.6 kg (149 lb)  SpO2 97%  Breastfeeding Unknown  BMI 28.15 kg/m2 Patient is status post spinal anesthesia for Procedure(s):  SECTION. Nausea/Vomiting: None Postoperative hydration reviewed and adequate. Pain: 
Pain Scale 1: Numeric (0 - 10) (10/14/18 2245) Pain Intensity 1: 7 (10/14/18 2245) Managed Neurological Status:  
Neuro (WDL): Within Defined Limits (10/14/18 2020) Neuro LUE Motor Response: Purposeful (10/14/18 2020) LLE Motor Response: Pharmacologically paralyzed (10/14/18 2020) RUE Motor Response: Purposeful (10/14/18 2020) RLE Motor Response: Pharmacologically paralyzed (10/14/18 2020) At baseline Mental Status and Level of Consciousness: Arousable Pulmonary Status:  
O2 Device: Room air (10/14/18 2035) Adequate oxygenation and airway patent Complications related to anesthesia: None Post-anesthesia assessment completed. No concerns Signed By: Hugh Burrows MD   
 2018

## 2018-10-15 NOTE — PROGRESS NOTES
SW assessment due to documentation in chart indicating history of depression. Patient states that she has \"felt fine\" during pregnancy. She was on Wellbutrin prior to pregnancy and then discontinued medication when she became pregnant. Patient states that she came off of her Wellbutrin during her first pregnancy and then resumed it after she delivered. She plans on doing the same thing now and will resume the Wellbutrin now that she has delivered. Patient denies ever receiving therapy for depression. Patient reports having a strong support system of family in the area. Progress note from  indicates that family was provided with meal vouchers via patient relations.  inquired about any current financial hardships. This was denied by family. Family states that they have car seat and all needed items to care for baby. Patient recently enrolled in [facility] program.   
 
 provided informational packet on  mood disorder education/resources. Family receptive to receiving information and denied any additional needs from . Family has this 's contact information should any needs/questions arise. Ghada [address]

## 2018-10-16 LAB — HBV SURFACE AG SERPL QL IA: NEGATIVE

## 2018-10-16 PROCEDURE — 74011250637 HC RX REV CODE- 250/637: Performed by: OBSTETRICS & GYNECOLOGY

## 2018-10-16 PROCEDURE — 77030014008 HC SPNG HEMSTAT J&J -C

## 2018-10-16 PROCEDURE — 74011250637 HC RX REV CODE- 250/637: Performed by: ANESTHESIOLOGY

## 2018-10-16 PROCEDURE — 65270000029 HC RM PRIVATE

## 2018-10-16 RX ADMIN — SIMETHICONE CHEW TAB 80 MG 80 MG: 80 TABLET ORAL at 08:52

## 2018-10-16 RX ADMIN — PRENATAL VIT W/ FE FUMARATE-FA TAB 27-0.8 MG 1 TABLET: 27-0.8 TAB at 08:52

## 2018-10-16 RX ADMIN — IBUPROFEN 800 MG: 800 TABLET ORAL at 17:47

## 2018-10-16 RX ADMIN — OXYCODONE HYDROCHLORIDE AND ACETAMINOPHEN 2 TABLET: 7.5; 325 TABLET ORAL at 22:51

## 2018-10-16 RX ADMIN — LEVOTHYROXINE SODIUM 25 MCG: 50 TABLET ORAL at 21:50

## 2018-10-16 RX ADMIN — IBUPROFEN 800 MG: 800 TABLET ORAL at 05:13

## 2018-10-16 RX ADMIN — IBUPROFEN 800 MG: 800 TABLET ORAL at 11:24

## 2018-10-16 RX ADMIN — IBUPROFEN 800 MG: 800 TABLET ORAL at 23:39

## 2018-10-16 RX ADMIN — SIMETHICONE CHEW TAB 80 MG 80 MG: 80 TABLET ORAL at 11:24

## 2018-10-16 RX ADMIN — SIMETHICONE CHEW TAB 80 MG 80 MG: 80 TABLET ORAL at 17:47

## 2018-10-16 RX ADMIN — SENNOSIDES AND DOCUSATE SODIUM 2 TABLET: 8.6; 5 TABLET ORAL at 08:51

## 2018-10-16 RX ADMIN — SIMETHICONE CHEW TAB 80 MG 80 MG: 80 TABLET ORAL at 21:51

## 2018-10-16 NOTE — PROGRESS NOTES
Pt is S/P primary  at 39 3/7 weeks due to NR-FHTs. No complaints today. Normal PO pain. No GI/ issues. Lochia < menses. No F/C. Visit Vitals  /63 (BP 1 Location: Left arm, BP Patient Position: At rest)  Pulse 68  Temp 97.8 °F (36.6 °C)  Resp 17  Ht 5' 1\" (1.549 m)  Wt 149 lb (67.6 kg)  SpO2 96%  Breastfeeding Unknown  BMI 28.15 kg/m2 CV - RRR 
LUNGS - CTA bilaterally ABD - soft, appropriate tenderness, incision C/D/I 
EXT - tr edema bilaterally Labs:   
Recent Results (from the past 24 hour(s)) HEPATITIS B SURFACE AG W/REFLEX Collection Time: 10/15/18  3:17 PM  
Result Value Ref Range Hep B surface Ag screen NEGATIVE  NEGATIVE    
 
 
POD #2 LTCS Pt is breast feeding and plans on P4 for birth control. Stable, cont. routine PP/PO care Brian Colvin MD 
12:57 PM 
10/16/18 What to expect when you have contrast    During your exam, we will inject  contrast  into your vein or artery. (Contrast is a clear liquid with iodine in it. It shows up on X-rays.)    You may feel warm or hot. You may have a metal taste in your mouth and a slight upset stomach. You may also feel pressure near the kidneys and bladder. These effects will last about 1 to 3 minutes.    Please tell us if you have:    Sneezing     Itching    Hives     Swelling in the face    A hoarse voice    Breathing problems    Other new symptoms    Serious problems are rare.  They may include:    Irregular heartbeat     Seizures    Kidney failure              Tissue damage    Shock      Death    If you have any problems during the exam, we  will treat them right away.    When you get home    Call your hospital if you have any new symptoms in the next 2 days, like hives or swelling. (Phone numbers are at the bottom of this page.) Or call your family doctor.     If you have wheezing or trouble breathing, call 911.    Self-care  -Drink at least 4 extra glasses of water today.   This reduces the stress on your kidneys.  -Keep taking your regular medicines.    The contrast will pass out of your body in your  Urine(pee). This will happen in the next 24 hours. You  will not feel this. Your urine will not  change color.    If you have kidney problems or take metformin    Drink 4 to 8 large glasses of water for the next  2 days, if you are not on a fluid restriction.    ?If you take metformin (Glucophage or Glucovance) for diabetes, keep taking it.      ?Your kidney function tests are abnormal.  If you take Metformin, do not take it for 48 hours. Please go to your clinic for a blood test within 3 days after your exam before the restarting this medicine.     (Note to provider:please give patient prescription for lab tests.)    ?Special instructions: ***    I have read and understand the above information.    Patient Sign  Here:______________________________________Date:________Time:______    Staff Sign Here:________________________________________Date:_______Time:______      Radiology Departments:     ?[facility]: [phone] ?[place]: [phone]     ?[place]: [phone] ?[place]:[phone]      ?[place]: [phone]  ?[place]: [phone]  ?[place]:[phone]    ?[facility] [place]:[phone]  ?[facility] [place]:[phone]

## 2018-10-16 NOTE — LACTATION NOTE
This note was copied from a baby's chart. In to check on feedings. Mom reports baby latching and nursing well. Not feeding, baby sleeping in mom's arms sucking on pacifier. Has been latching well and good output in past 24 hours. Mom denies any concerns or questions about feeding. Has personal use pump at bedside, not pumping. Offered to show mom set up of pump, mom declined and states \"my mom showed me how it worked\". Reassured mom that with baby latching so well, no need for additional pumping unless mom desires. Mom states she does not intend to pump yet. Offered to assist with feeding/observe latch as desired by mom, will call out as needed.

## 2018-10-17 VITALS
TEMPERATURE: 97.7 F | OXYGEN SATURATION: 96 % | SYSTOLIC BLOOD PRESSURE: 112 MMHG | RESPIRATION RATE: 18 BRPM | HEART RATE: 61 BPM | BODY MASS INDEX: 28.13 KG/M2 | WEIGHT: 149 LBS | DIASTOLIC BLOOD PRESSURE: 75 MMHG | HEIGHT: 61 IN

## 2018-10-17 PROCEDURE — 74011250636 HC RX REV CODE- 250/636: Performed by: OBSTETRICS & GYNECOLOGY

## 2018-10-17 PROCEDURE — 90715 TDAP VACCINE 7 YRS/> IM: CPT | Performed by: OBSTETRICS & GYNECOLOGY

## 2018-10-17 PROCEDURE — 74011250637 HC RX REV CODE- 250/637: Performed by: OBSTETRICS & GYNECOLOGY

## 2018-10-17 RX ORDER — IBUPROFEN 600 MG/1
600 TABLET ORAL
Qty: 60 TAB | Refills: 1 | Status: SHIPPED | OUTPATIENT
Start: 2018-10-17 | End: 2018-11-29

## 2018-10-17 RX ORDER — OXYCODONE AND ACETAMINOPHEN 7.5; 325 MG/1; MG/1
1 TABLET ORAL
Qty: 28 TAB | Refills: 0 | Status: SHIPPED | OUTPATIENT
Start: 2018-10-17 | End: 2018-10-31 | Stop reason: ALTCHOICE

## 2018-10-17 RX ADMIN — PRENATAL VIT W/ FE FUMARATE-FA TAB 27-0.8 MG 1 TABLET: 27-0.8 TAB at 09:46

## 2018-10-17 RX ADMIN — SENNOSIDES AND DOCUSATE SODIUM 2 TABLET: 8.6; 5 TABLET ORAL at 09:46

## 2018-10-17 RX ADMIN — SIMETHICONE CHEW TAB 80 MG 80 MG: 80 TABLET ORAL at 12:42

## 2018-10-17 RX ADMIN — TETANUS TOXOID, REDUCED DIPHTHERIA TOXOID AND ACELLULAR PERTUSSIS VACCINE, ADSORBED 0.5 ML: 5; 2.5; 8; 8; 2.5 SUSPENSION INTRAMUSCULAR at 09:47

## 2018-10-17 RX ADMIN — SIMETHICONE CHEW TAB 80 MG 80 MG: 80 TABLET ORAL at 09:46

## 2018-10-17 RX ADMIN — IBUPROFEN 800 MG: 800 TABLET ORAL at 12:42

## 2018-10-17 RX ADMIN — IBUPROFEN 800 MG: 800 TABLET ORAL at 05:34

## 2018-10-17 NOTE — LACTATION NOTE
Mom and baby are going home today. Continue to offer the breast without restriction. Mom's milk should be fully in over the next few days. Reviewed engorgement precautions. Hand Expression has been demoed and written hand-out reviewed. As milk comes in baby will be more alert at the breast and swallows will be more obvious. Breasts may feel softer once baby has finished nursing. Baby should be back to birth weight by 3weeks of age. And then gain on average 1 oz per day for the next 2-3 months. Reviewed babies should be exclusively breastfeeding for the first 6 months and that breastfeeding should continue after introduction of appropriate complimentary foods after 6 months. Initial output should be at least 1 wet and 1 bowel movement for each day old baby is. By day 5-7 once milk is fully in baby will consistently have 6 or more soaking wet diapers and about 4 bowel movement. Some babies have a bowel movement with every feeding and some have 1-3 large bowel movements each day. Inadequate output may indicate inadequate feedings and should be reported to your Pediatrician. Bowel habits may change as baby gets older. Encouraged follow-up at Pediatrician in 1-2 days, no later than 1 week of age. Call [facility] for any questions as needed or to set up an OP visit. OP phone calls are returned within 24 hours. Community Breastfeeding Resource List given.

## 2018-10-17 NOTE — PROGRESS NOTES
Pt is S/P primary  at 39 3/7 weeks due to NR-FHTs. No complaints today. Normal PO pain. No GI/ issues. Lochia < menses. No F/C. Visit Vitals /75 (BP 1 Location: Left arm, BP Patient Position: At rest) Pulse 61 Temp 97.7 °F (36.5 °C) Resp 18 Ht 5' 1\" (1.549 m) Wt 149 lb (67.6 kg) SpO2 96% Breastfeeding? Unknown BMI 28.15 kg/m² CV - RRR 
LUNGS - CTA bilaterally ABD - soft, appropriate tenderness, incision C/D/I 
EXT - tr edema bilaterally Labs:  No results found for this or any previous visit (from the past 24 hour(s)). POD #3 LTCS Pt is breast feeding and plans on P4 method for birth control. Stable, cont. routine PP/PO instructions Carolina Wells MD 
1:34 PM 
10/17/18

## 2018-10-17 NOTE — DISCHARGE INSTRUCTIONS
Section: What to Expect at [address] [place] [address]    A  section, or , is surgery to deliver your baby through a cut, called an incision, that the doctor makes in your lower belly and uterus. You may have some pain in your lower belly and need pain medicine for 1 to 2 weeks. You can expect some vaginal bleeding for several weeks. You will probably need about 6 weeks to fully recover. It is important to take it easy while the incision is healing. Avoid heavy lifting, strenuous activities, or exercises that strain the belly muscles while you are recovering. Ask a family member or friend for help with housework, cooking, and shopping. This care sheet gives you a general idea about how long it will take for you to recover. But each person recovers at a different pace. Follow the steps below to get better as quickly as possible. How can you care for yourself at home? Activity    · Rest when you feel tired. Getting enough sleep will help you recover.     · Try to walk each day. Start by walking a little more than you did the day before. Bit by bit, increase the amount you walk. Walking boosts blood flow and helps prevent pneumonia, constipation, and blood clots.     · Avoid strenuous activities, such as bicycle riding, jogging, weightlifting, and aerobic exercise, for 6 weeks or until your doctor says it is okay.     · Until your doctor says it is okay, do not lift anything heavier than your baby.     · Do not do sit-ups or other exercises that strain the belly muscles for 6 weeks or until your doctor says it is okay.     · Hold a pillow over your incision when you cough or take deep breaths. This will support your belly and decrease your pain.     · You may shower as usual. Pat the incision dry when you are done.     · You will have some vaginal bleeding. Wear sanitary pads.  Do not douche or use tampons until your doctor says it is okay.     · Ask your doctor when you can drive again.     · You will probably need to take at least 6 weeks off work. It depends on the type of work you do and how you feel.     · Ask your doctor when it is okay for you to have sex. Diet    · You can eat your normal diet. If your stomach is upset, try bland, low-fat foods like plain rice, broiled chicken, toast, and yogurt.     · Drink plenty of fluids (unless your doctor tells you not to).     · You may notice that your bowel movements are not regular right after your surgery. This is common. Try to avoid constipation and straining with bowel movements. You may want to take a fiber supplement every day. If you have not had a bowel movement after a couple of days, ask your doctor about taking a mild laxative.     · If you are breastfeeding, limit alcohol. Alcohol can cause a lack of energy and other health problems for the baby when a breastfeeding woman drinks heavily. It can also get in the way of a mom's ability to feed her baby or to care for the child in other ways. There isn't a lot of research about exactly how much alcohol can harm a baby. Having no alcohol is the safest choice for your baby. If you choose to have a drink now and then, have only one drink, and limit the number of occasions that you have a drink. Wait to breastfeed at least 2 hours after you have a drink to reduce the amount of alcohol the baby may get in the milk. Medicines    · Your doctor will tell you if and when you can restart your medicines. He or she will also give you instructions about taking any new medicines.     · If you take blood thinners, such as warfarin (Coumadin), clopidogrel (Plavix), or aspirin, be sure to talk to your doctor. He or she will tell you if and when to start taking those medicines again. Make sure that you understand exactly what your doctor wants you to do.     · Take pain medicines exactly as directed. ? If the doctor gave you a prescription medicine for pain, take it as prescribed.   ? If you are not taking a prescription pain medicine, ask your doctor if you can take an over-the-counter medicine.     · If you think your pain medicine is making you sick to your stomach:  ? Take your medicine after meals (unless your doctor has told you not to). ? Ask your doctor for a different pain medicine.     · If your doctor prescribed antibiotics, take them as directed. Do not stop taking them just because you feel better. You need to take the full course of antibiotics. Incision care    · If you have strips of tape on the incision, leave the tape on for a week or until it falls off.     · Wash the area daily with warm, soapy water, and pat it dry. Don't use hydrogen peroxide or alcohol, which can slow healing. You may cover the area with a gauze bandage if it weeps or rubs against clothing. Change the bandage every day.     · Keep the area clean and dry. Other instructions    · If you breastfeed your baby, you may be more comfortable while you are healing if you place the baby so that he or she is not resting on your belly. Try tucking your baby under your arm, with his or her body along the side you will be feeding on. Support your baby's upper body with your arm. With that hand you can control your baby's head to bring his or her mouth to your breast. This is sometimes called the football hold. Follow-up care is a key part of your treatment and safety. Be sure to make and go to all appointments, and call your doctor if you are having problems. It's also a good idea to know your test results and keep a list of the medicines you take. When should you call for help? Call 911 anytime you think you may need emergency care.  For example, call if:    · You passed out (lost consciousness).     · You have chest pain, are short of breath, or cough up blood.    Call your doctor now or seek immediate medical care if:    · You have pain that does not get better after you take pain medicine.     · You have severe vaginal bleeding.     · You are dizzy or lightheaded, or you feel like you may faint.     · You have new or worse pain in your belly or pelvis.     · You have loose stitches, or your incision comes open.     · You have symptoms of infection, such as:  ? Increased pain, swelling, warmth, or redness. ? Red streaks leading from the incision. ? Pus draining from the incision. ? A fever.     · You have symptoms of a blood clot in your leg (called a deep vein thrombosis), such as:  ? Pain in your calf, back of the knee, thigh, or groin. ? Redness and swelling in your leg or groin.    Watch closely for changes in your health, and be sure to contact your doctor if:    · You do not get better as expected. Where can you learn more? Go to http://tanner-jt.info/. Enter M806 in the search box to learn more about \" Section: What to Expect at Home. \"  Current as of: 2017  Content Version: 11.8  © 8724-6700 [employer]. Care instructions adapted under license by [employer] (which disclaims liability or warranty for this information). If you have questions about a medical condition or this instruction, always ask your healthcare professional. [address] any warranty or liability for your use of this information.

## 2018-10-25 NOTE — DISCHARGE SUMMARY
Date of Admission:  10/14/2018  1:45 PM  Date of Discharge:  10/17/2018  3:03 PM    Patient is a 21 y.o.  at 39w3d wks who was admitted for labor. She ultimately underwent primary . The indication for  was NR-FHTs. A Low Transverse  was performed with normal amount of blood loss. On Post Op day #1, the patient had her catheter removed and was ambulating well in the byrnes. Her post operative hemoglobin was stable. Patient had a normal post operative course. Incision stayed clean and dry, without erythema. Patient remained afebrile throughout the entire hospital stay. On day of discharge, she was discharged home in good condition with routine post  instructions and scheduled to follow up in two weeks for an incision check at [address]. She was breast feeding the infant and plans on P4 method for birth control. Discharge Meds:    Discharge Medication List as of 10/17/2018  2:14 PM      START taking these medications    Details   ibuprofen (MOTRIN) 600 mg tablet Take 1 Tab by mouth every six (6) hours as needed., Normal, Disp-60 Tab, R-1      oxyCODONE-acetaminophen (PERCOCET 7.5) 7.5-325 mg per tablet Take 1 Tab by mouth every four (4) hours as needed. Max Daily Amount: 6 Tabs., Print, Disp-28 Tab, R-0         CONTINUE these medications which have NOT CHANGED    Details   Cetirizine (ZYRTEC) 10 mg cap Take  by mouth., Historical Med      levothyroxine (SYNTHROID) 25 mcg tablet Take 1 Tab by mouth Daily (before breakfast). , Normal, Disp-90 Tab, R-1      albuterol (PROAIR HFA) 90 mcg/actuation inhaler 1-2 puffs q 4-6 hours prn wheezing, Normal, Disp-3 Inhaler, R-3      mometasone-formoterol (DULERA) 200-5 mcg/actuation HFA inhaler Take 2 Puffs by inhalation two (2) times a day., Normal, Disp-3 Inhaler, R-3      montelukast (SINGULAIR) 10 mg tablet Take 1 Tab by mouth daily. , Normal, Disp-30 Tab, R-11      PNV MARI.91/LMFQYMN FUM/FOLIC AC (PRENATAL PO) Take  by mouth., Historical Med      ferrous sulfate 324 mg (65 mg iron) tablet Take  by mouth Daily (before breakfast). , Miko Barraza MD  10:49 AM  10/25/18

## 2018-10-31 PROBLEM — O41.03X0 OLIGOHYDRAMNIOS IN THIRD TRIMESTER: Status: RESOLVED | Noted: 2018-10-14 | Resolved: 2018-10-31

## 2018-10-31 PROBLEM — Z48.89 POSTOPERATIVE VISIT: Status: ACTIVE | Noted: 2018-10-31

## 2018-10-31 PROBLEM — E07.9 THYROID DISORDER: Status: ACTIVE | Noted: 2018-10-31

## 2018-10-31 PROBLEM — Z98.891 S/P CESAREAN SECTION: Status: RESOLVED | Noted: 2018-10-14 | Resolved: 2018-10-31

## 2018-10-31 PROBLEM — O36.8390 VARIABLE FETAL HEART RATE DECELERATIONS, ANTEPARTUM: Status: RESOLVED | Noted: 2018-10-14 | Resolved: 2018-10-31

## 2018-10-31 PROBLEM — O28.3 FETAL ECHOGENIC INTRACARDIAC FOCUS ON PRENATAL ULTRASOUND: Status: RESOLVED | Noted: 2018-05-21 | Resolved: 2018-10-31

## 2018-10-31 PROBLEM — R10.9 ABDOMINAL PAIN DURING PREGNANCY IN THIRD TRIMESTER: Status: RESOLVED | Noted: 2018-10-14 | Resolved: 2018-10-31

## 2018-10-31 PROBLEM — O99.519 ASTHMA AFFECTING PREGNANCY, ANTEPARTUM: Status: RESOLVED | Noted: 2018-02-26 | Resolved: 2018-10-31

## 2018-10-31 PROBLEM — Z34.83 MULTIGRAVIDA IN THIRD TRIMESTER: Status: RESOLVED | Noted: 2018-02-26 | Resolved: 2018-10-31

## 2018-10-31 PROBLEM — O36.5930 POOR FETAL GROWTH AFFECTING MANAGEMENT OF MOTHER IN THIRD TRIMESTER: Status: RESOLVED | Noted: 2018-10-11 | Resolved: 2018-10-31

## 2018-10-31 PROBLEM — E03.9 HYPOTHYROIDISM AFFECTING PREGNANCY IN THIRD TRIMESTER: Status: RESOLVED | Noted: 2018-02-26 | Resolved: 2018-10-31

## 2018-10-31 PROBLEM — O26.893 ABDOMINAL PAIN DURING PREGNANCY IN THIRD TRIMESTER: Status: RESOLVED | Noted: 2018-10-14 | Resolved: 2018-10-31

## 2018-10-31 PROBLEM — O99.283 HYPOTHYROIDISM AFFECTING PREGNANCY IN THIRD TRIMESTER: Status: RESOLVED | Noted: 2018-02-26 | Resolved: 2018-10-31

## 2018-10-31 PROBLEM — O36.5933 IUGR (INTRAUTERINE GROWTH RESTRICTION) AFFECTING CARE OF MOTHER, THIRD TRIMESTER, FETUS 3: Status: RESOLVED | Noted: 2018-10-14 | Resolved: 2018-10-31

## 2018-10-31 PROBLEM — J45.909 ASTHMA AFFECTING PREGNANCY, ANTEPARTUM: Status: RESOLVED | Noted: 2018-02-26 | Resolved: 2018-10-31

## 2018-11-14 PROBLEM — O99.343: Status: RESOLVED | Noted: 2018-02-26 | Resolved: 2018-11-14

## 2018-11-14 PROBLEM — F32.A: Status: RESOLVED | Noted: 2018-02-26 | Resolved: 2018-11-14

## 2018-11-29 PROBLEM — Z48.89 POSTOPERATIVE VISIT: Status: RESOLVED | Noted: 2018-10-31 | Resolved: 2018-11-29

## 2018-12-13 PROBLEM — Z30.09 BIRTH CONTROL COUNSELING: Status: ACTIVE | Noted: 2018-12-13

## 2019-04-26 PROBLEM — F41.9 ANXIETY AND DEPRESSION: Status: ACTIVE | Noted: 2019-04-26

## 2019-04-26 PROBLEM — F32.A ANXIETY AND DEPRESSION: Status: ACTIVE | Noted: 2019-04-26

## 2019-09-19 ENCOUNTER — HOSPITAL ENCOUNTER (EMERGENCY)
Age: 24
Discharge: HOME | End: 2019-09-20
Attending: EMERGENCY MEDICINE
Payer: COMMERCIAL

## 2019-09-19 DIAGNOSIS — R19.7 VOMITING AND DIARRHEA: Primary | ICD-10-CM

## 2019-09-19 DIAGNOSIS — E86.0 DEHYDRATION: ICD-10-CM

## 2019-09-19 DIAGNOSIS — R11.10 VOMITING AND DIARRHEA: Primary | ICD-10-CM

## 2019-09-19 LAB
ALBUMIN SERPL-MCNC: 4 G/DL (ref 3.5–5)
ALBUMIN/GLOB SERPL: 1 {RATIO} (ref 1.2–3.5)
ALP SERPL-CCNC: 58 U/L (ref 50–136)
ALT SERPL-CCNC: 18 U/L (ref 12–65)
ANION GAP SERPL CALC-SCNC: 7 MMOL/L (ref 7–16)
AST SERPL-CCNC: 43 U/L (ref 15–37)
BASOPHILS # BLD: 0 K/UL (ref 0–0.2)
BASOPHILS NFR BLD: 0 % (ref 0–2)
BILIRUB SERPL-MCNC: 1 MG/DL (ref 0.2–1.1)
BUN SERPL-MCNC: 8 MG/DL (ref 6–23)
CALCIUM SERPL-MCNC: 8.7 MG/DL (ref 8.3–10.4)
CHLORIDE SERPL-SCNC: 107 MMOL/L (ref 98–107)
CO2 SERPL-SCNC: 24 MMOL/L (ref 21–32)
CREAT SERPL-MCNC: 0.84 MG/DL (ref 0.6–1)
DIFFERENTIAL METHOD BLD: ABNORMAL
EOSINOPHIL # BLD: 0.1 K/UL (ref 0–0.8)
EOSINOPHIL NFR BLD: 1 % (ref 0.5–7.8)
ERYTHROCYTE [DISTWIDTH] IN BLOOD BY AUTOMATED COUNT: 11.5 % (ref 11.9–14.6)
GLOBULIN SER CALC-MCNC: 4.1 G/DL (ref 2.3–3.5)
GLUCOSE SERPL-MCNC: 90 MG/DL (ref 65–100)
HCT VFR BLD AUTO: 45.5 % (ref 35.8–46.3)
HGB BLD-MCNC: 15 G/DL (ref 11.7–15.4)
IMM GRANULOCYTES # BLD AUTO: 0 K/UL (ref 0–0.5)
IMM GRANULOCYTES NFR BLD AUTO: 0 % (ref 0–5)
LYMPHOCYTES # BLD: 0.6 K/UL (ref 0.5–4.6)
LYMPHOCYTES NFR BLD: 5 % (ref 13–44)
MCH RBC QN AUTO: 31.8 PG (ref 26.1–32.9)
MCHC RBC AUTO-ENTMCNC: 33 G/DL (ref 31.4–35)
MCV RBC AUTO: 96.4 FL (ref 79.6–97.8)
MONOCYTES # BLD: 0.5 K/UL (ref 0.1–1.3)
MONOCYTES NFR BLD: 4 % (ref 4–12)
NEUTS SEG # BLD: 10.3 K/UL (ref 1.7–8.2)
NEUTS SEG NFR BLD: 90 % (ref 43–78)
NRBC # BLD: 0 K/UL (ref 0–0.2)
PLATELET # BLD AUTO: 236 K/UL (ref 150–450)
PMV BLD AUTO: 11.5 FL (ref 9.4–12.3)
POTASSIUM SERPL-SCNC: 4.5 MMOL/L (ref 3.5–5.1)
PROT SERPL-MCNC: 8.1 G/DL (ref 6.3–8.2)
RBC # BLD AUTO: 4.72 M/UL (ref 4.05–5.2)
SODIUM SERPL-SCNC: 138 MMOL/L (ref 136–145)
WBC # BLD AUTO: 11.5 K/UL (ref 4.3–11.1)

## 2019-09-19 PROCEDURE — 99284 EMERGENCY DEPT VISIT MOD MDM: CPT | Performed by: EMERGENCY MEDICINE

## 2019-09-19 PROCEDURE — 81003 URINALYSIS AUTO W/O SCOPE: CPT | Performed by: EMERGENCY MEDICINE

## 2019-09-19 PROCEDURE — 80053 COMPREHEN METABOLIC PANEL: CPT

## 2019-09-19 PROCEDURE — 85025 COMPLETE CBC W/AUTO DIFF WBC: CPT

## 2019-09-19 PROCEDURE — 96361 HYDRATE IV INFUSION ADD-ON: CPT | Performed by: EMERGENCY MEDICINE

## 2019-09-19 PROCEDURE — 96374 THER/PROPH/DIAG INJ IV PUSH: CPT | Performed by: EMERGENCY MEDICINE

## 2019-09-19 PROCEDURE — 74011250636 HC RX REV CODE- 250/636: Performed by: EMERGENCY MEDICINE

## 2019-09-19 RX ORDER — HYOSCYAMINE SULFATE 0.12 MG/1
0.12 TABLET SUBLINGUAL
Qty: 12 TAB | Status: SHIPPED | OUTPATIENT
Start: 2019-09-19 | End: 2019-10-24 | Stop reason: ALTCHOICE

## 2019-09-19 RX ORDER — SODIUM CHLORIDE 9 MG/ML
1000 INJECTION, SOLUTION INTRAVENOUS ONCE
Status: COMPLETED | OUTPATIENT
Start: 2019-09-19 | End: 2019-09-20

## 2019-09-19 RX ORDER — ONDANSETRON 4 MG/1
4 TABLET, ORALLY DISINTEGRATING ORAL
Qty: 10 TAB | Refills: 0 | Status: SHIPPED | OUTPATIENT
Start: 2019-09-19 | End: 2019-10-24 | Stop reason: ALTCHOICE

## 2019-09-19 RX ORDER — ONDANSETRON 2 MG/ML
4 INJECTION INTRAMUSCULAR; INTRAVENOUS
Status: COMPLETED | OUTPATIENT
Start: 2019-09-19 | End: 2019-09-19

## 2019-09-19 RX ADMIN — ONDANSETRON 4 MG: 2 INJECTION INTRAMUSCULAR; INTRAVENOUS at 21:16

## 2019-09-19 RX ADMIN — SODIUM CHLORIDE 1000 ML: 900 INJECTION, SOLUTION INTRAVENOUS at 21:16

## 2019-09-19 RX ADMIN — SODIUM CHLORIDE 1000 ML: 900 INJECTION, SOLUTION INTRAVENOUS at 21:29

## 2019-09-19 NOTE — ED TRIAGE NOTES
Patient presents from home with complaints of waking up nauseous. Patient took a nap later in the day, and started vomiting at 1600. Patient states diarrhea that began yesterday. Family was sick 3 weeks ago with same symptoms. Patient denies urinary symptoms.

## 2019-09-20 VITALS
RESPIRATION RATE: 18 BRPM | WEIGHT: 140 LBS | BODY MASS INDEX: 26.43 KG/M2 | DIASTOLIC BLOOD PRESSURE: 66 MMHG | OXYGEN SATURATION: 99 % | HEART RATE: 86 BPM | HEIGHT: 61 IN | SYSTOLIC BLOOD PRESSURE: 104 MMHG | TEMPERATURE: 98 F

## 2019-09-20 NOTE — DISCHARGE INSTRUCTIONS
Nausea medications as directed. Levsin for cramping if needed as directed. OTC pedialyte or Gatoraide G2-small amounts frequently (1-2 swallows) every 5 minutes to prevent dehydration. Advance diet as tolerated. No milk or dairy until diarrhea is resolved. Follow up with your Dr or the Dr provided in 3-4 days if not improving. Return if dehydration-no urine for 12 hours, dry mouth and unable to keep fluids down or any concerns.

## 2019-09-20 NOTE — ED NOTES
Verbal report received from ALEXANDRU Deluca for continuation of patient care upon shift change. Patient care transferred at this time.

## 2019-09-20 NOTE — ED PROVIDER NOTES
The history is provided by the patient. Vomiting    This is a new problem. The current episode started 6 to 12 hours ago. The problem occurs 5 to 10 times per day. The problem has not changed since onset. There has been no fever. Associated symptoms include abdominal pain and diarrhea. Pertinent negatives include no fever, no myalgias, no cough and no URI. The patient is not pregnant.        Past Medical History:   Diagnosis Date    Allergic rhinitis due to pollen     Asthma     Attention deficit disorder without mention of hyperactivity     Chronic tension headache     Contact dermatitis and other eczema, due to unspecified cause     Depressive disorder, not elsewhere classified     Dizziness and giddiness     Folliculitis     Herpetic gingivostomatitis     Hypothyroidism     Irritability     Knee pain     Low back pain     Myalgia and myositis, unspecified     Nausea alone     Other kyphoscoliosis and scoliosis     Personal history of allergy to penicillin     Raynaud's syndrome     Reflux        Past Surgical History:   Procedure Laterality Date    HX HEENT  2012 2013    sinus     HX TONSIL AND ADENOIDECTOMY      INSERT PLEURAL CATHETER      drain fluid off lung age 11-9         Family History:   Problem Relation Age of Onset    Attention Deficit Hyperactivity Disorder Mother     No Known Problems Sister     No Known Problems Brother     No Known Problems Brother     Coronary Artery Disease Maternal Grandmother     Diabetes Maternal Grandmother        Social History     Socioeconomic History    Marital status: SINGLE     Spouse name: Not on file    Number of children: Not on file    Years of education: Not on file    Highest education level: Not on file   Occupational History    Not on file   Social Needs    Financial resource strain: Not on file    Food insecurity:     Worry: Not on file     Inability: Not on file    Transportation needs:     Medical: Not on file Non-medical: Not on file   Tobacco Use    Smoking status: Never Smoker    Smokeless tobacco: Never Used   Substance and Sexual Activity    Alcohol use: No    Drug use: No    Sexual activity: Yes     Partners: Male     Birth control/protection: Injection   Lifestyle    Physical activity:     Days per week: Not on file     Minutes per session: Not on file    Stress: Not on file   Relationships    Social connections:     Talks on phone: Not on file     Gets together: Not on file     Attends Nondenominational service: Not on file     Active member of club or organization: Not on file     Attends meetings of clubs or organizations: Not on file     Relationship status: Not on file    Intimate partner violence:     Fear of current or ex partner: Not on file     Emotionally abused: Not on file     Physically abused: Not on file     Forced sexual activity: Not on file   Other Topics Concern     Service Not Asked    Blood Transfusions Not Asked    Caffeine Concern No    Occupational Exposure Not Asked   [place] Hazards Not Asked    Sleep Concern Not Asked    Stress Concern Not Asked    Weight Concern Not Asked    Special Diet Not Asked    Back Care Not Asked    Exercise No    Bike Helmet Not Asked    Seat Belt Yes    Self-Exams No   Social History Narrative    Abuse: Feels safe at home, no history of physical abuse, no history of sexual abuse         ALLERGIES: Egg; Influenza virus vaccine, specific; Peanut; Adderall [dextroamphetamine-amphetamine]; Augmentin [amoxicillin-pot clavulanate]; and Penicillins    Review of Systems   Constitutional: Negative for fever. Respiratory: Negative for cough. Gastrointestinal: Positive for abdominal pain, diarrhea and vomiting. Negative for blood in stool. No bilious or bloody vomit and no bloody bowel movements. Endocrine: Negative for polyuria. Genitourinary: Negative for decreased urine volume, dysuria, frequency and urgency.    Musculoskeletal: Negative for back pain and myalgias. Neurological: Positive for light-headedness. Negative for weakness and numbness. Vitals:    09/19/19 2000   BP: 110/71   Pulse: (!) 122   Resp: 18   Temp: 98.1 °F (36.7 °C)   SpO2: 100%   Weight: 63.5 kg (140 lb)   Height: 5' 1\" (1.549 m)            Physical Exam   Constitutional: She appears well-developed and well-nourished. No distress. HENT:   Head: Normocephalic. Mucous membranes tacky but not completely dry   Eyes: Pupils are equal, round, and reactive to light. Cardiovascular: Normal rate, regular rhythm and normal heart sounds. Pulmonary/Chest: Effort normal and breath sounds normal.   Abdominal: Soft. She exhibits no distension and no mass. There is no tenderness. There is no rebound and no guarding. Abdomen soft and nontender and quite benign   Musculoskeletal: Normal range of motion. Lymphadenopathy:     She has no cervical adenopathy. Neurological: She is alert. Skin: Skin is warm and dry. Nursing note and vitals reviewed. MDM  Number of Diagnoses or Management Options  Diagnosis management comments: Likely viral gastroenteritis. Some family members sick 2 to 3 weeks ago. Abdomen soft and benign. Tachycardia noted and mild dehydration will be addressed with IV fluids. Check CBC and CMP to evaluate for acute renal failure or renal insufficiency or electrolyte abnormality. Zofran for nausea. Urine pregnancy test to be evaluated but no urinary symptoms are present.        Amount and/or Complexity of Data Reviewed  Clinical lab tests: ordered and reviewed           Procedures

## 2019-09-20 NOTE — ED NOTES
I have reviewed discharge instructions with the patient. The patient verbalized understanding. Patient to follow up with PMD as referred and RTED with any changes/concerns. Patient expresses understanding. Patient ambulatory from ED in NAD with Rx x 2. Patient's mother to drive her home.

## 2020-02-28 ENCOUNTER — HOSPITAL ENCOUNTER (OUTPATIENT)
Dept: CT IMAGING | Age: 25
Discharge: HOME | End: 2020-02-28
Payer: COMMERCIAL

## 2020-02-28 DIAGNOSIS — J32.0 CHRONIC MAXILLARY SINUSITIS: ICD-10-CM

## 2020-02-28 PROCEDURE — 70486 CT MAXILLOFACIAL W/O DYE: CPT

## 2020-06-17 ENCOUNTER — HOSPITAL ENCOUNTER (EMERGENCY)
Age: 25
Discharge: HOME | End: 2020-06-18
Attending: EMERGENCY MEDICINE
Payer: COMMERCIAL

## 2020-06-17 ENCOUNTER — APPOINTMENT (OUTPATIENT)
Dept: CT IMAGING | Age: 25
End: 2020-06-17
Attending: EMERGENCY MEDICINE
Payer: COMMERCIAL

## 2020-06-17 DIAGNOSIS — R10.30 LOWER ABDOMINAL PAIN: Primary | ICD-10-CM

## 2020-06-17 LAB
ERYTHROCYTE [DISTWIDTH] IN BLOOD BY AUTOMATED COUNT: 11.9 % (ref 11.9–14.6)
HCG UR QL: NEGATIVE
HCT VFR BLD AUTO: 45 % (ref 35.8–46.3)
HGB BLD-MCNC: 14.8 G/DL (ref 11.7–15.4)
MCH RBC QN AUTO: 30.6 PG (ref 26.1–32.9)
MCHC RBC AUTO-ENTMCNC: 32.9 G/DL (ref 31.4–35)
MCV RBC AUTO: 93 FL (ref 79.6–97.8)
NRBC # BLD: 0 K/UL (ref 0–0.2)
PLATELET # BLD AUTO: 289 K/UL (ref 150–450)
PMV BLD AUTO: 11 FL (ref 9.4–12.3)
RBC # BLD AUTO: 4.84 M/UL (ref 4.05–5.2)
WBC # BLD AUTO: 9.5 K/UL (ref 4.3–11.1)

## 2020-06-17 PROCEDURE — 81001 URINALYSIS AUTO W/SCOPE: CPT

## 2020-06-17 PROCEDURE — 99284 EMERGENCY DEPT VISIT MOD MDM: CPT

## 2020-06-17 PROCEDURE — 74177 CT ABD & PELVIS W/CONTRAST: CPT

## 2020-06-17 PROCEDURE — 81025 URINE PREGNANCY TEST: CPT

## 2020-06-17 PROCEDURE — 74011250636 HC RX REV CODE- 250/636: Performed by: EMERGENCY MEDICINE

## 2020-06-17 PROCEDURE — 85027 COMPLETE CBC AUTOMATED: CPT

## 2020-06-17 PROCEDURE — 81003 URINALYSIS AUTO W/O SCOPE: CPT

## 2020-06-17 PROCEDURE — 80048 BASIC METABOLIC PNL TOTAL CA: CPT

## 2020-06-17 PROCEDURE — 74011636320 HC RX REV CODE- 636/320: Performed by: EMERGENCY MEDICINE

## 2020-06-17 RX ORDER — SODIUM CHLORIDE 0.9 % (FLUSH) 0.9 %
10 SYRINGE (ML) INJECTION
Status: COMPLETED | OUTPATIENT
Start: 2020-06-17 | End: 2020-06-18

## 2020-06-17 RX ADMIN — DIATRIZOATE MEGLUMINE AND DIATRIZOATE SODIUM 15 ML: 600; 100 SOLUTION ORAL; RECTAL at 23:24

## 2020-06-17 RX ADMIN — SODIUM CHLORIDE 1000 ML: 900 INJECTION, SOLUTION INTRAVENOUS at 23:24

## 2020-06-18 ENCOUNTER — PATIENT OUTREACH (OUTPATIENT)
Dept: CASE MANAGEMENT | Age: 25
End: 2020-06-18

## 2020-06-18 VITALS
HEART RATE: 72 BPM | DIASTOLIC BLOOD PRESSURE: 86 MMHG | RESPIRATION RATE: 16 BRPM | OXYGEN SATURATION: 98 % | WEIGHT: 145 LBS | BODY MASS INDEX: 27.38 KG/M2 | HEIGHT: 61 IN | SYSTOLIC BLOOD PRESSURE: 134 MMHG | TEMPERATURE: 98.4 F

## 2020-06-18 LAB
ANION GAP SERPL CALC-SCNC: 6 MMOL/L (ref 7–16)
APPEARANCE UR: ABNORMAL
BACTERIA URNS QL MICRO: ABNORMAL /HPF
BILIRUB UR QL: ABNORMAL
BUN SERPL-MCNC: 13 MG/DL (ref 6–23)
CALCIUM SERPL-MCNC: 9 MG/DL (ref 8.3–10.4)
CASTS URNS QL MICRO: ABNORMAL /LPF
CHLORIDE SERPL-SCNC: 109 MMOL/L (ref 98–107)
CO2 SERPL-SCNC: 23 MMOL/L (ref 21–32)
COLOR UR: ABNORMAL
CREAT SERPL-MCNC: 0.82 MG/DL (ref 0.6–1)
EPI CELLS #/AREA URNS HPF: ABNORMAL /HPF
GLUCOSE SERPL-MCNC: 92 MG/DL (ref 65–100)
GLUCOSE UR STRIP.AUTO-MCNC: NEGATIVE MG/DL
HGB UR QL STRIP: ABNORMAL
KETONES UR QL STRIP.AUTO: 15 MG/DL
LEUKOCYTE ESTERASE UR QL STRIP.AUTO: ABNORMAL
NITRITE UR QL STRIP.AUTO: POSITIVE
PH UR STRIP: 6 [PH] (ref 5–9)
POTASSIUM SERPL-SCNC: 3.4 MMOL/L (ref 3.5–5.1)
PROT UR STRIP-MCNC: 30 MG/DL
SODIUM SERPL-SCNC: 138 MMOL/L (ref 136–145)
SP GR UR REFRACTOMETRY: 1.03 (ref 1–1.02)
UROBILINOGEN UR QL STRIP.AUTO: 2 EU/DL (ref 0.2–1)
WBC URNS QL MICRO: ABNORMAL /HPF

## 2020-06-18 PROCEDURE — 74011000258 HC RX REV CODE- 258: Performed by: EMERGENCY MEDICINE

## 2020-06-18 PROCEDURE — 74011636320 HC RX REV CODE- 636/320: Performed by: EMERGENCY MEDICINE

## 2020-06-18 RX ORDER — DICLOFENAC SODIUM 50 MG/1
50 TABLET, DELAYED RELEASE ORAL 2 TIMES DAILY
Qty: 14 TAB | Refills: 0 | Status: SHIPPED | OUTPATIENT
Start: 2020-06-18 | End: 2021-05-27 | Stop reason: ALTCHOICE

## 2020-06-18 RX ORDER — DICLOFENAC SODIUM 50 MG/1
50 TABLET, DELAYED RELEASE ORAL 2 TIMES DAILY
Qty: 14 TAB | Refills: 0 | Status: SHIPPED | OUTPATIENT
Start: 2020-06-18 | End: 2020-06-18

## 2020-06-18 RX ORDER — PROMETHAZINE HYDROCHLORIDE 25 MG/1
25 TABLET ORAL
Qty: 12 TAB | Refills: 0 | Status: SHIPPED | OUTPATIENT
Start: 2020-06-18 | End: 2020-06-18

## 2020-06-18 RX ORDER — PROMETHAZINE HYDROCHLORIDE 25 MG/1
25 TABLET ORAL
Qty: 12 TAB | Refills: 0 | Status: SHIPPED | OUTPATIENT
Start: 2020-06-18 | End: 2021-05-27 | Stop reason: ALTCHOICE

## 2020-06-18 RX ADMIN — SODIUM CHLORIDE 100 ML: 900 INJECTION, SOLUTION INTRAVENOUS at 01:09

## 2020-06-18 RX ADMIN — IOPAMIDOL 100 ML: 755 INJECTION, SOLUTION INTRAVENOUS at 01:09

## 2020-06-18 RX ADMIN — Medication 10 ML: at 01:09

## 2020-06-18 NOTE — ED NOTES
I have reviewed discharge instructions with the patient. The patient verbalized understanding. Patient left ED via Discharge Method: ambulatory to Home with self. Opportunity for questions and clarification provided. Patient given 0 scripts. To continue your aftercare when you leave the hospital, you may receive an automated call from our care team to check in on how you are doing. This is a free service and part of our promise to provide the best care and service to meet your aftercare needs.  If you have questions, or wish to unsubscribe from this service please call [phone]. Thank you for Choosing our [facility] Emergency Department.

## 2020-06-18 NOTE — ED TRIAGE NOTES
Pt is ambulatory to ER from home with mask in place. Pt states that she went to  this morning for pain with urination and was Dx with UTI. Pt states that the pain has gotten worse since she was seen this morning. Pt states she has taken 2 doses of cephalexin and 1 dose of AZO approx 1 hr ago. She has returned to be seen tonight due to increase in pain that is bl flank pain. Pt states they did a urine culture and UA this afternoon when she was seen at [address] [place] [address].

## 2020-06-18 NOTE — ED PROVIDER NOTES
26-year-old lady presents with concerns about lower abdominal pain that radiates into her back that she says started this afternoon. She was diagnosed this morning at an urgent care with a UTI and given a prescription for antibiotics. She said a couple hours after that was when the pain became very severe which was prior to her starting her antibiotics. She said that she had some nausea associate with this as well. She denies any fevers or chills and has had no diarrhea. She has had a  but no other abdominal surgeries. No other associated symptoms. Elements of this note were created using speech recognition software. As such, errors of speech recognition may be present.            Past Medical History:   Diagnosis Date    Allergic rhinitis due to pollen     Asthma     Attention deficit disorder without mention of hyperactivity     Chronic tension headache     Contact dermatitis and other eczema, due to unspecified cause     Depressive disorder, not elsewhere classified     Dizziness and giddiness     Folliculitis     Herpetic gingivostomatitis     Hypothyroidism     Irritability     Knee pain     Low back pain     Myalgia and myositis, unspecified     Nausea alone     Other kyphoscoliosis and scoliosis     Personal history of allergy to penicillin     Raynaud's syndrome     Reflux        Past Surgical History:   Procedure Laterality Date    HX HEENT   2013    sinus     HX TONSIL AND ADENOIDECTOMY      INSERT PLEURAL CATHETER      drain fluid off lung age 11-9         Family History:   Problem Relation Age of Onset    Attention Deficit Hyperactivity Disorder Mother     No Known Problems Sister     No Known Problems Brother     No Known Problems Brother     Coronary Artery Disease Maternal Grandmother     Diabetes Maternal Grandmother        Social History     Socioeconomic History    Marital status:      Spouse name: Not on file    Number of children: Not on file    Years of education: Not on file    Highest education level: Not on file   Occupational History    Not on file   Social Needs    Financial resource strain: Not on file    Food insecurity     Worry: Not on file     Inability: Not on file    Transportation needs     Medical: Not on file     Non-medical: Not on file   Tobacco Use    Smoking status: Never Smoker    Smokeless tobacco: Never Used   Substance and Sexual Activity    Alcohol use: No    Drug use: No    Sexual activity: Yes     Partners: Male     Birth control/protection: Injection   Lifestyle    Physical activity     Days per week: Not on file     Minutes per session: Not on file    Stress: Not on file   Relationships    Social connections     Talks on phone: Not on file     Gets together: Not on file     Attends [employer] service: Not on file     Active member of club or organization: Not on file     Attends meetings of clubs or organizations: Not on file     Relationship status: Not on file    Intimate partner violence     Fear of current or ex partner: Not on file     Emotionally abused: Not on file     Physically abused: Not on file     Forced sexual activity: Not on file   Other Topics Concern     Service Not Asked    Blood Transfusions Not Asked    Caffeine Concern No    Occupational Exposure Not Asked   [place] Hazards Not Asked    Sleep Concern Not Asked    Stress Concern Not Asked    Weight Concern Not Asked    Special Diet Not Asked    Back Care Not Asked    Exercise No    Bike Helmet Not Asked    Seat Belt Yes    Self-Exams No   Social History Narrative    Abuse: Feels safe at home, no history of physical abuse, no history of sexual abuse         ALLERGIES: Egg; Influenza virus vaccine, specific; Peanut; Adderall [dextroamphetamine-amphetamine]; Augmentin [amoxicillin-pot clavulanate]; and Penicillins    Review of Systems   Constitutional: Negative for chills, diaphoresis and fever.    HENT: Negative for congestion, rhinorrhea and sore throat. Eyes: Negative for redness and visual disturbance. Respiratory: Negative for cough, chest tightness, shortness of breath and wheezing. Cardiovascular: Negative for chest pain and palpitations. Gastrointestinal: Positive for abdominal pain and nausea. Negative for blood in stool, diarrhea and vomiting. Endocrine: Negative for polydipsia and polyuria. Genitourinary: Negative for dysuria and hematuria. Musculoskeletal: Negative for arthralgias, myalgias and neck stiffness. Skin: Negative for rash. Allergic/Immunologic: Negative for environmental allergies and food allergies. Neurological: Negative for dizziness, weakness and headaches. Hematological: Negative for adenopathy. Does not bruise/bleed easily. Psychiatric/Behavioral: Negative for confusion and sleep disturbance. The patient is not nervous/anxious. Vitals:    06/17/20 2218 06/17/20 2321   BP: (!) 142/95    Pulse: 79    Resp: 18    Temp: 98.3 °F (36.8 °C)    SpO2: 98% 98%   Weight: 65.8 kg (145 lb)    Height: 5' 1\" (1.549 m)             Physical Exam  Vitals signs and nursing note reviewed. Constitutional:       General: She is not in acute distress. Appearance: She is well-developed. She is not toxic-appearing. HENT:      Head: Normocephalic and atraumatic. Eyes:      General: No scleral icterus. Right eye: No discharge. Left eye: No discharge. Conjunctiva/sclera: Conjunctivae normal.      Pupils: Pupils are equal, round, and reactive to light. Neck:      Musculoskeletal: Normal range of motion. No neck rigidity. Cardiovascular:      Rate and Rhythm: Normal rate and regular rhythm. Heart sounds: Normal heart sounds. Pulmonary:      Effort: Pulmonary effort is normal. No respiratory distress. Breath sounds: Normal breath sounds. No wheezing or rales. Chest:      Chest wall: No tenderness.    Abdominal:      General: Bowel sounds are normal. There is no distension. Palpations: Abdomen is soft. Tenderness: There is abdominal tenderness. There is no guarding or rebound. Comments: Patient is tender in her right lower quadrant. Musculoskeletal: Normal range of motion. General: No tenderness. Lymphadenopathy:      Cervical: No cervical adenopathy. Skin:     General: Skin is warm and dry. Neurological:      General: No focal deficit present. Mental Status: She is alert and oriented to person, place, and time. Psychiatric:         Mood and Affect: Mood normal.         Behavior: Behavior normal.          MDM  Number of Diagnoses or Management Options  Diagnosis management comments: We will do CT scan to evaluate for appendicitis.          Procedures

## 2020-06-19 ENCOUNTER — PATIENT OUTREACH (OUTPATIENT)
Dept: CASE MANAGEMENT | Age: 25
End: 2020-06-19

## 2020-07-08 PROBLEM — R39.15 URINARY URGENCY: Status: ACTIVE | Noted: 2020-07-08

## 2020-07-08 PROBLEM — Z00.8 ENCOUNTER FOR SPECIALIZED MEDICAL EXAMINATION: Status: ACTIVE | Noted: 2020-07-08

## 2020-10-11 ENCOUNTER — APPOINTMENT (OUTPATIENT)
Dept: CT IMAGING | Age: 25
End: 2020-10-11
Attending: EMERGENCY MEDICINE
Payer: COMMERCIAL

## 2020-10-11 ENCOUNTER — HOSPITAL ENCOUNTER (EMERGENCY)
Age: 25
Discharge: HOME | End: 2020-10-11
Attending: EMERGENCY MEDICINE
Payer: COMMERCIAL

## 2020-10-11 ENCOUNTER — APPOINTMENT (OUTPATIENT)
Dept: GENERAL RADIOLOGY | Age: 25
End: 2020-10-11
Attending: EMERGENCY MEDICINE
Payer: COMMERCIAL

## 2020-10-11 VITALS
TEMPERATURE: 98.1 F | BODY MASS INDEX: 27.19 KG/M2 | HEIGHT: 61 IN | HEART RATE: 88 BPM | OXYGEN SATURATION: 98 % | SYSTOLIC BLOOD PRESSURE: 128 MMHG | WEIGHT: 144 LBS | RESPIRATION RATE: 16 BRPM | DIASTOLIC BLOOD PRESSURE: 68 MMHG

## 2020-10-11 DIAGNOSIS — J18.9 COMMUNITY ACQUIRED PNEUMONIA OF LEFT LOWER LOBE OF LUNG: Primary | ICD-10-CM

## 2020-10-11 LAB
ALBUMIN SERPL-MCNC: 3.3 G/DL (ref 3.5–5)
ALBUMIN/GLOB SERPL: 0.7 {RATIO} (ref 1.2–3.5)
ALP SERPL-CCNC: 74 U/L (ref 50–136)
ALT SERPL-CCNC: 14 U/L (ref 12–65)
ANION GAP SERPL CALC-SCNC: 5 MMOL/L (ref 7–16)
AST SERPL-CCNC: 11 U/L (ref 15–37)
BACTERIA URNS QL MICRO: 0 /HPF
BASOPHILS # BLD: 0.1 K/UL (ref 0–0.2)
BASOPHILS NFR BLD: 0 % (ref 0–2)
BILIRUB SERPL-MCNC: 0.3 MG/DL (ref 0.2–1.1)
BUN SERPL-MCNC: 7 MG/DL (ref 6–23)
CALCIUM SERPL-MCNC: 8.6 MG/DL (ref 8.3–10.4)
CASTS URNS QL MICRO: NORMAL /LPF
CHLORIDE SERPL-SCNC: 107 MMOL/L (ref 98–107)
CO2 SERPL-SCNC: 29 MMOL/L (ref 21–32)
CREAT SERPL-MCNC: 0.83 MG/DL (ref 0.6–1)
DIFFERENTIAL METHOD BLD: ABNORMAL
EOSINOPHIL # BLD: 0.3 K/UL (ref 0–0.8)
EOSINOPHIL NFR BLD: 2 % (ref 0.5–7.8)
EPI CELLS #/AREA URNS HPF: NORMAL /HPF
ERYTHROCYTE [DISTWIDTH] IN BLOOD BY AUTOMATED COUNT: 11.7 % (ref 11.9–14.6)
GLOBULIN SER CALC-MCNC: 4.6 G/DL (ref 2.3–3.5)
GLUCOSE SERPL-MCNC: 99 MG/DL (ref 65–100)
HCG UR QL: NEGATIVE
HCT VFR BLD AUTO: 39.6 % (ref 35.8–46.3)
HGB BLD-MCNC: 13.1 G/DL (ref 11.7–15.4)
IMM GRANULOCYTES # BLD AUTO: 0.1 K/UL (ref 0–0.5)
IMM GRANULOCYTES NFR BLD AUTO: 1 % (ref 0–5)
LYMPHOCYTES # BLD: 1.8 K/UL (ref 0.5–4.6)
LYMPHOCYTES NFR BLD: 15 % (ref 13–44)
MCH RBC QN AUTO: 31 PG (ref 26.1–32.9)
MCHC RBC AUTO-ENTMCNC: 33.1 G/DL (ref 31.4–35)
MCV RBC AUTO: 93.6 FL (ref 79.6–97.8)
MONOCYTES # BLD: 1 K/UL (ref 0.1–1.3)
MONOCYTES NFR BLD: 8 % (ref 4–12)
NEUTS SEG # BLD: 9.2 K/UL (ref 1.7–8.2)
NEUTS SEG NFR BLD: 74 % (ref 43–78)
NRBC # BLD: 0 K/UL (ref 0–0.2)
PLATELET # BLD AUTO: 344 K/UL (ref 150–450)
PMV BLD AUTO: 10.3 FL (ref 9.4–12.3)
POTASSIUM SERPL-SCNC: 3.7 MMOL/L (ref 3.5–5.1)
PROT SERPL-MCNC: 7.9 G/DL (ref 6.3–8.2)
RBC # BLD AUTO: 4.23 M/UL (ref 4.05–5.2)
RBC #/AREA URNS HPF: NORMAL /HPF
SODIUM SERPL-SCNC: 141 MMOL/L (ref 136–145)
WBC # BLD AUTO: 12.4 K/UL (ref 4.3–11.1)
WBC URNS QL MICRO: NORMAL /HPF

## 2020-10-11 PROCEDURE — 74011250637 HC RX REV CODE- 250/637: Performed by: EMERGENCY MEDICINE

## 2020-10-11 PROCEDURE — 81003 URINALYSIS AUTO W/O SCOPE: CPT

## 2020-10-11 PROCEDURE — 74011000636 HC RX REV CODE- 636: Performed by: EMERGENCY MEDICINE

## 2020-10-11 PROCEDURE — 74011250636 HC RX REV CODE- 250/636: Performed by: EMERGENCY MEDICINE

## 2020-10-11 PROCEDURE — 80053 COMPREHEN METABOLIC PANEL: CPT

## 2020-10-11 PROCEDURE — 96365 THER/PROPH/DIAG IV INF INIT: CPT

## 2020-10-11 PROCEDURE — 85025 COMPLETE CBC W/AUTO DIFF WBC: CPT

## 2020-10-11 PROCEDURE — 71260 CT THORAX DX C+: CPT

## 2020-10-11 PROCEDURE — 99284 EMERGENCY DEPT VISIT MOD MDM: CPT

## 2020-10-11 PROCEDURE — 71045 X-RAY EXAM CHEST 1 VIEW: CPT

## 2020-10-11 PROCEDURE — 81015 MICROSCOPIC EXAM OF URINE: CPT

## 2020-10-11 PROCEDURE — 74011000258 HC RX REV CODE- 258: Performed by: EMERGENCY MEDICINE

## 2020-10-11 PROCEDURE — 81025 URINE PREGNANCY TEST: CPT

## 2020-10-11 RX ORDER — DOXYCYCLINE HYCLATE 100 MG
100 TABLET ORAL 2 TIMES DAILY
Qty: 14 TAB | Refills: 0 | Status: SHIPPED | OUTPATIENT
Start: 2020-10-12 | End: 2020-10-19

## 2020-10-11 RX ORDER — DOXYCYCLINE 100 MG/1
100 CAPSULE ORAL ONCE
Status: COMPLETED | OUTPATIENT
Start: 2020-10-11 | End: 2020-10-11

## 2020-10-11 RX ORDER — SODIUM CHLORIDE 0.9 % (FLUSH) 0.9 %
5-10 SYRINGE (ML) INJECTION
Status: DISCONTINUED | OUTPATIENT
Start: 2020-10-11 | End: 2020-10-11 | Stop reason: HOSPADM

## 2020-10-11 RX ADMIN — CEFTRIAXONE SODIUM 1 G: 1 INJECTION, POWDER, FOR SOLUTION INTRAMUSCULAR; INTRAVENOUS at 19:53

## 2020-10-11 RX ADMIN — IOPAMIDOL 80 ML: 755 INJECTION, SOLUTION INTRAVENOUS at 19:08

## 2020-10-11 RX ADMIN — DOXYCYCLINE HYCLATE 100 MG: 100 CAPSULE ORAL at 18:55

## 2020-10-11 NOTE — DISCHARGE INSTRUCTIONS
Patient Education        Pneumonia: Care Instructions  Your Care Instructions     Pneumonia is an infection of the lungs. Most cases are caused by infections from bacteria or viruses. Pneumonia may be mild or very severe. If it is caused by bacteria, you will be treated with antibiotics. It may take a few weeks to a few months to recover fully from pneumonia, depending on how sick you were and whether your overall health is good. Follow-up care is a key part of your treatment and safety. Be sure to make and go to all appointments, and call your doctor if you are having problems. It's also a good idea to know your test results and keep a list of the medicines you take. How can you care for yourself at home? · Take your antibiotics exactly as directed. Do not stop taking the medicine just because you are feeling better. You need to take the full course of antibiotics. · Take your medicines exactly as prescribed. Call your doctor if you think you are having a problem with your medicine. · Get plenty of rest and sleep. You may feel weak and tired for a while, but your energy level will improve with time. · To prevent dehydration, drink plenty of fluids, enough so that your urine is light yellow or clear like water. Choose water and other caffeine-free clear liquids until you feel better. If you have kidney, heart, or liver disease and have to limit fluids, talk with your doctor before you increase the amount of fluids you drink. · Take care of your cough so you can rest. A cough that brings up mucus from your lungs is common with pneumonia. It is one way your body gets rid of the infection. But if coughing keeps you from resting or causes severe fatigue and chest-wall pain, talk to your doctor. He or she may suggest that you take a medicine to reduce the cough. · Use a vaporizer or humidifier to add moisture to your bedroom. Follow the directions for cleaning the machine.   · Do not smoke or allow others to smoke around you. Smoke will make your cough last longer. If you need help quitting, talk to your doctor about stop-smoking programs and medicines. These can increase your chances of quitting for good. · Take an over-the-counter pain medicine, such as acetaminophen (Tylenol), ibuprofen (Advil, Motrin), or naproxen (Aleve). Read and follow all instructions on the label. · Do not take two or more pain medicines at the same time unless the doctor told you to. Many pain medicines have acetaminophen, which is Tylenol. Too much acetaminophen (Tylenol) can be harmful. · If you were given a spirometer to measure how well your lungs are working, use it as instructed. This can help your doctor tell how your recovery is going. · To prevent pneumonia in the future, talk to your doctor about getting a flu vaccine (once a year) and a pneumococcal vaccine (one time only for most people). When should you call for help? Call 911 anytime you think you may need emergency care. For example, call if:    · You have severe trouble breathing. Call your doctor now or seek immediate medical care if:    · You cough up dark brown or bloody mucus (sputum).     · You have new or worse trouble breathing.     · You are dizzy or lightheaded, or you feel like you may faint. Watch closely for changes in your health, and be sure to contact your doctor if:    · You have a new or higher fever.     · You are coughing more deeply or more often.     · You are not getting better after 2 days (48 hours).     · You do not get better as expected. Where can you learn more? Go to http://www.rimidi.com/  Enter D336 in the search box to learn more about \"Pneumonia: Care Instructions. \"  Current as of: February 24, 2020               Content Version: 12.6  © 6072-2670 [employer]. Care instructions adapted under license by [employer] (which disclaims liability or warranty for this information).  If you have questions about a medical condition or this instruction, always ask your healthcare professional. [place] [address] any warranty or liability for your use of this information.

## 2020-10-11 NOTE — ED PROVIDER NOTES
HPI    20-year-old female presents to the ED with complaint of cough malaise and dyspnea. Ongoing for the past week. Seen [facility] ago. Reportedly had negative COVID swab. She is on Omnicef. Patient states that she is feeling a little bit better but still gets short winded has some nausea. No hemoptysis. No lower extremity edema.   Past Medical History:   Diagnosis Date    Allergic rhinitis due to pollen     Asthma     Attention deficit disorder without mention of hyperactivity     Chronic tension headache     Contact dermatitis and other eczema, due to unspecified cause     Depressive disorder, not elsewhere classified     Dizziness and giddiness     Folliculitis     Herpetic gingivostomatitis     Hypothyroidism     Irritability     Knee pain     Low back pain     Myalgia and myositis, unspecified     Nausea alone     Other kyphoscoliosis and scoliosis     Personal history of allergy to penicillin     Raynaud's syndrome     Reflux        Past Surgical History:   Procedure Laterality Date    HX HEENT  2012 2013    sinus     HX TONSIL AND ADENOIDECTOMY      INSERT PLEURAL CATHETER      drain fluid off lung age 11-9         Family History:   Problem Relation Age of Onset    Attention Deficit Hyperactivity Disorder Mother     No Known Problems Sister     No Known Problems Brother     No Known Problems Brother     Coronary Artery Disease Maternal Grandmother     Diabetes Maternal Grandmother     Diabetes Maternal Aunt     Breast Cancer Maternal Aunt     Colon Cancer Neg Hx     Ovarian Cancer Neg Hx     Uterine Cancer Neg Hx        Social History     Socioeconomic History    Marital status:      Spouse name: Not on file    Number of children: Not on file    Years of education: Not on file    Highest education level: Not on file   Occupational History    Not on file   Social Needs    Financial resource strain: Not on file    Food insecurity     Worry: Not on file     Inability: Not on file    Transportation needs     Medical: Not on file     Non-medical: Not on file   Tobacco Use    Smoking status: Never Smoker    Smokeless tobacco: Never Used   Substance and Sexual Activity    Alcohol use: No    Drug use: No    Sexual activity: Yes     Partners: Male     Birth control/protection: Patch   Lifestyle    Physical activity     Days per week: Not on file     Minutes per session: Not on file    Stress: Not on file   Relationships    Social connections     Talks on phone: Not on file     Gets together: Not on file     Attends Rastafarian service: Not on file     Active member of club or organization: Not on file     Attends meetings of clubs or organizations: Not on file     Relationship status: Not on file    Intimate partner violence     Fear of current or ex partner: Not on file     Emotionally abused: Not on file     Physically abused: Not on file     Forced sexual activity: Not on file   Other Topics Concern     Service Not Asked    Blood Transfusions Not Asked    Caffeine Concern No    Occupational Exposure Not Asked   Gaston Wynn Hazards Not Asked    Sleep Concern Not Asked    Stress Concern Not Asked    Weight Concern Not Asked    Special Diet Not Asked    Back Care Not Asked    Exercise No    Bike Helmet Not Asked    Seat Belt Yes    Self-Exams No   Social History Narrative    Abuse: Feels safe at home, no history of physical abuse, no history of sexual abuse         ALLERGIES: Egg; Influenza virus vaccine, specific; Peanut; Adderall [dextroamphetamine-amphetamine]; Augmentin [amoxicillin-pot clavulanate]; and Penicillins    Review of Systems  Review of Systems Negative Except as in HPI    Vitals:    10/11/20 1653   BP: 123/69   Pulse: (!) 122   Resp: 20   Temp: 99.5 °F (37.5 °C)   SpO2: 96%   Weight: 65.3 kg (144 lb)   Height: 5' 1\" (1.549 m)            Physical Exam  Vitals signs and nursing note reviewed.    Constitutional:       General: She is not in acute distress. Appearance: She is not ill-appearing, toxic-appearing or diaphoretic. HENT:      Mouth/Throat:      Mouth: Mucous membranes are moist.   Eyes:      General: No scleral icterus. Cardiovascular:      Rate and Rhythm: Regular rhythm. Tachycardia present. Pulmonary:      Effort: Pulmonary effort is normal.      Breath sounds: Normal breath sounds. Abdominal:      Palpations: Abdomen is soft. Tenderness: There is no abdominal tenderness. Skin:     General: Skin is warm and dry. Neurological:      Comments: Awake, alert. GCS 15. CN II-XII grossly intact. Speech clear. No gross lateralizing neuro deficits. Psychiatric:         Behavior: Behavior normal.          MDM       Procedures      Recent Results (from the past 12 hour(s))   CBC WITH AUTOMATED DIFF    Collection Time: 10/11/20  4:56 PM   Result Value Ref Range    WBC 12.4 (H) 4.3 - 11.1 K/uL    RBC 4.23 4.05 - 5.2 M/uL    HGB 13.1 11.7 - 15.4 g/dL    HCT 39.6 35.8 - 46.3 %    MCV 93.6 79.6 - 97.8 FL    MCH 31.0 26.1 - 32.9 PG    MCHC 33.1 31.4 - 35.0 g/dL    RDW 11.7 (L) 11.9 - 14.6 %    PLATELET 937 243 - 591 K/uL    MPV 10.3 9.4 - 12.3 FL    ABSOLUTE NRBC 0.00 0.0 - 0.2 K/uL    DF AUTOMATED      NEUTROPHILS 74 43 - 78 %    LYMPHOCYTES 15 13 - 44 %    MONOCYTES 8 4.0 - 12.0 %    EOSINOPHILS 2 0.5 - 7.8 %    BASOPHILS 0 0.0 - 2.0 %    IMMATURE GRANULOCYTES 1 0.0 - 5.0 %    ABS. NEUTROPHILS 9.2 (H) 1.7 - 8.2 K/UL    ABS. LYMPHOCYTES 1.8 0.5 - 4.6 K/UL    ABS. MONOCYTES 1.0 0.1 - 1.3 K/UL    ABS. EOSINOPHILS 0.3 0.0 - 0.8 K/UL    ABS. BASOPHILS 0.1 0.0 - 0.2 K/UL    ABS. IMM.  GRANS. 0.1 0.0 - 0.5 K/UL   METABOLIC PANEL, COMPREHENSIVE    Collection Time: 10/11/20  4:56 PM   Result Value Ref Range    Sodium 141 136 - 145 mmol/L    Potassium 3.7 3.5 - 5.1 mmol/L    Chloride 107 98 - 107 mmol/L    CO2 29 21 - 32 mmol/L    Anion gap 5 (L) 7 - 16 mmol/L    Glucose 99 65 - 100 mg/dL    BUN 7 6 - 23 MG/DL    Creatinine 0.83 0.6 - 1.0 MG/DL    GFR est AA >60 >60 ml/min/1.73m2    GFR est non-AA >60 >60 ml/min/1.73m2    Calcium 8.6 8.3 - 10.4 MG/DL    Bilirubin, total 0.3 0.2 - 1.1 MG/DL    ALT (SGPT) 14 12 - 65 U/L    AST (SGOT) 11 (L) 15 - 37 U/L    Alk. phosphatase 74 50 - 136 U/L    Protein, total 7.9 6.3 - 8.2 g/dL    Albumin 3.3 (L) 3.5 - 5.0 g/dL    Globulin 4.6 (H) 2.3 - 3.5 g/dL    A-G Ratio 0.7 (L) 1.2 - 3.5     HCG URINE, QL. - POC    Collection Time: 10/11/20  5:38 PM   Result Value Ref Range    Pregnancy test,urine (POC) Negative NEG     URINE MICROSCOPIC    Collection Time: 10/11/20  5:40 PM   Result Value Ref Range    WBC 0-3 0 /hpf    RBC 3-5 0 /hpf    Epithelial cells 0-3 0 /hpf    Bacteria 0 0 /hpf    Casts 0-3 0 /lpf       Xr Chest Sngl V    Result Date: 10/11/2020  CHEST X-RAY, single portable view  10/11/2020 History: Shortness of breath and cough. Left-sided back pain for 4 days. Technique: Single frontal view of the chest. Comparison: Chest x-ray 2/1/2016 Findings: The cardiac silhouette is normal in respect to size. The lungs are expanded without evidence for pneumothorax. No consolidative airspace process or pleural effusion is seen. Evolving nodularity is seen at the left lung base, however. IMPRESSION: 1. Evolving nodularity at the left lung base. This can be seen either with a neoplastic or inflammatory process. This would be best further characterized with a preferably contrasted CT scan of the chest. This report was made using voice transcription. Despite my best efforts to avoid any, transcription errors may persist. If there is any question about the accuracy of the report or need for clarification, then please call [phone], or text me through perfectserv for clarification or correction. Ct Chest W Cont    Result Date: 10/11/2020  CT CHEST WITH CONTRAST DATED 10/11/2020. History: Abnormal chest x-ray.  Comparison: Chest x-ray 10/11/2020 Technique:   Multiple contiguous helical CT images reconstructed at 5 mm were obtained from the neck base to the mid abdomen following the administration of 100 cc of Isovue 370 without acute complication. All CT scans performed at this facility use one or all of the following: Automated exposure control, adjustment of the mA and/or kVp according to patient's size, iterative reconstruction. Findings: The base of the neck is unremarkable in appearance. No axillary, mediastinal, or hilar lymphadenopathy is seen. The thoracic aorta is normal in caliber. Evaluation with lung windows demonstrates the prior chest x-ray findings are at least in part related to significant consolidation in the posterior segment of the left lower lobe. Additional bronchiectatic changes are seen in the lingula with distal fluid impaction suggesting endobronchial infection. Additional similar bronchi ectatic changes are seen in the right upper lobe on image 21. Mild inflammatory nodularity is seen in the right upper lobe on axial image 25. No pleural effusion is seen. Lungs are expanded without evidence for pneumothorax. No acute osseous abnormality is seen. Limited evaluation of the upper abdomen demonstrates no acute abnormality. IMPRESSION:  1. Left lung changes by x-ray consistent with an inflammatory process. Specifically, there is partial consolidation of the left lower lobe with additional bronchiectatic changes as described above with additional inflammatory changes associated with these bronchiectatic changes in the lingula. Although some features can be seen with an atypical infection such as atypical mycobacterial infection, some changes can also be seen with chronic aspiration. Recommend clinical correlation. This report was made using voice transcription.  Despite my best efforts to avoid any, transcription errors may persist. If there is any question about the accuracy of the report or need for clarification, then please call [phone], or text me through vida for clarification or correction. Medications   sodium chloride 0.9 % bolus infusion 100 mL (has no administration in time range)   sodium chloride (NS) flush 5-10 mL (has no administration in time range)   cefTRIAXone (ROCEPHIN) 1 g in 0.9% sodium chloride (MBP/ADV) 50 mL (has no administration in time range)   iopamidoL (ISOVUE-370) 76 % injection 80 mL (80 mL IntraVENous Given 10/11/20 1908)   doxycycline (VIBRAMYCIN) capsule 100 mg (100 mg Oral Given 10/11/20 1855)       Disposition  Discussed results. Answered questions. We will treat as community-acquired pneumonia. She received Rocephin in the emergency department. Will discharge on doxycycline. She had a negative COVID swab within the past few days. Return precautions given. Please note, this chart was dictated using Dragon dictation, voice recognition software. While efforts were made to correct any transcription errors, some may inadvertently remain. Please forgive punctuation and typographic/voice recognition errors. Please contact me with any questions concerns or for clarification of documentation.

## 2020-10-11 NOTE — ED TRIAGE NOTES
Patient ambulatory to triage without difficulty; mask in place. Patient reports pain in left side of back and shortness of breath. Seen at [facility] care last week and placed on Cefdinir. States she is feeling better but still gets \"short winded\" and has some nausea. States her COVID test was negative.

## 2020-10-11 NOTE — Clinical Note
[facility] EMERGENCY DEPT 
[address]  [address] [facility] 
[phone] Work/School Note Date: 10/11/2020 To Whom It May concern: 
 
Shikha Jesus was seen and treated today in the emergency room by the following provider(s): 
Attending Provider: Elaine Young MD.   
 
Shikha Jesus is excused from work/school on 10/11/2020 through 10/14/2020. She is medically clear to return to work/school on 10/15/2020. Sincerely, Mahendra Brennan MD

## 2020-10-12 ENCOUNTER — PATIENT OUTREACH (OUTPATIENT)
Dept: CASE MANAGEMENT | Age: 25
End: 2020-10-12

## 2020-10-12 NOTE — PROGRESS NOTES
Attempted outreach to patient for HANNAH SOLARES for ED visit on 10/11/2020 for pneumonia   UTR number listed is wrong number  PLAN:  Case closed due to wrong number listed

## 2021-06-05 ENCOUNTER — HOSPITAL ENCOUNTER (EMERGENCY)
Age: 26
Discharge: HOME | End: 2021-06-05
Attending: EMERGENCY MEDICINE | Admitting: EMERGENCY MEDICINE
Payer: COMMERCIAL

## 2021-06-05 ENCOUNTER — APPOINTMENT (OUTPATIENT)
Dept: GENERAL RADIOLOGY | Age: 26
End: 2021-06-05
Attending: EMERGENCY MEDICINE
Payer: COMMERCIAL

## 2021-06-05 VITALS
RESPIRATION RATE: 16 BRPM | TEMPERATURE: 98.7 F | SYSTOLIC BLOOD PRESSURE: 98 MMHG | BODY MASS INDEX: 25.49 KG/M2 | HEIGHT: 61 IN | DIASTOLIC BLOOD PRESSURE: 68 MMHG | WEIGHT: 135 LBS | HEART RATE: 100 BPM | OXYGEN SATURATION: 97 %

## 2021-06-05 DIAGNOSIS — J01.00 ACUTE NON-RECURRENT MAXILLARY SINUSITIS: ICD-10-CM

## 2021-06-05 DIAGNOSIS — R05.9 COUGH: Primary | ICD-10-CM

## 2021-06-05 PROCEDURE — 99283 EMERGENCY DEPT VISIT LOW MDM: CPT

## 2021-06-05 PROCEDURE — 71046 X-RAY EXAM CHEST 2 VIEWS: CPT

## 2021-06-05 RX ORDER — PROMETHAZINE HYDROCHLORIDE 25 MG/1
12.5 TABLET ORAL
Qty: 10 TABLET | Refills: 0 | Status: SHIPPED | OUTPATIENT
Start: 2021-06-05 | End: 2021-09-09

## 2021-06-05 RX ORDER — ALBUTEROL SULFATE 90 UG/1
2 AEROSOL, METERED RESPIRATORY (INHALATION)
Qty: 6.7 INHALER | Refills: 0 | Status: SHIPPED | OUTPATIENT
Start: 2021-06-05

## 2021-06-05 RX ORDER — LORATADINE AND PSEUDOEPHEDRINE SULFATE 5; 120 MG/1; MG/1
1 TABLET, EXTENDED RELEASE ORAL 2 TIMES DAILY
Qty: 15 TABLET | Refills: 0 | Status: SHIPPED | OUTPATIENT
Start: 2021-06-05 | End: 2021-06-05 | Stop reason: SDUPTHER

## 2021-06-05 RX ORDER — LORATADINE AND PSEUDOEPHEDRINE SULFATE 5; 120 MG/1; MG/1
1 TABLET, EXTENDED RELEASE ORAL 2 TIMES DAILY
Qty: 15 TABLET | Refills: 0 | Status: SHIPPED | OUTPATIENT
Start: 2021-06-05 | End: 2021-09-09

## 2021-06-05 NOTE — LETTER
[address] [facility] EMERGENCY DEPT 
[address] [facility] 
[phone] Work/School Note Date: 6/5/2021 To Whom It May concern: 
 
Briana Mansfield was seen and treated today in the emergency room by the following provider(s): 
Attending Provider: Joshua Zaman MD 
Nurse Practitioner: Franchesca Argueta NP. Briana Mansfield may return to work on 06/07/2021.  
 
Sincerely, 
 
 
 
 
Marni Holloway NP

## 2021-06-05 NOTE — ED NOTES
I have reviewed discharge instructions with the patient. The patient verbalized understanding. Patient left ED via Discharge Method: ambulatory to Home with family. Opportunity for questions and clarification provided. Patient given 3 scripts. To continue your aftercare when you leave the hospital, you may receive an automated call from our care team to check in on how you are doing. This is a free service and part of our promise to provide the best care and service to meet your aftercare needs.  If you have questions, or wish to unsubscribe from this service please call [phone]. Thank you for Choosing our [facility] Emergency Department.

## 2021-06-05 NOTE — ED TRIAGE NOTES
Pt ambulatory to triage. Pt states she has nausea, HA, fatigue, bodyaches, chills, runny nose. Denies loss of taste or smell. Pt states she had pna before and this feels exactly like that.

## 2021-06-05 NOTE — LETTER
[address] [facility] EMERGENCY DEPT   [facility]  [address] [place] [id number] [phone]    Work/School Note    Date: 6/5/2021    To Whom It May concern:    Roberta Soto was seen and treated today in the emergency room by the following provider(s):  Attending Provider: Idalia Gibson MD  Nurse Practitioner: Debbie Downs NP.       Roberta Soto may return to work on 06/08/2021    Sincerely,          Gabby Perales RN

## 2021-06-05 NOTE — DISCHARGE INSTRUCTIONS
Take medication as prescribed. Follow-up with your primary care provider. Return to the emergency department for any new, worsening, or concerning symptoms.

## 2021-06-05 NOTE — ED PROVIDER NOTES
59-year-old female presents to the emergency department today with complaint of shortness of breath. Patient reports she has been having issues with shortness of breath for about 1 month. She states that on Thursday she began to have some nausea, headache, body aches, and cough. She reports green sputum production with cough. Patient reports that she has had pneumonia in the past and was afraid that she had pneumonia again. The history is provided by the patient. Cough  This is a new problem. The cough is productive of sputum. Patient reports a subjective fever - was not measured. Associated symptoms include chills, shortness of breath and nausea. Pertinent negatives include no chest pain, no ear pain, no headaches, no rhinorrhea, no wheezing and no vomiting. She has tried nothing for the symptoms. She is not a smoker. Her past medical history is significant for pneumonia. Generalized Body Aches  This is a new problem. The current episode started more than 2 days ago. Associated symptoms include shortness of breath. Pertinent negatives include no chest pain, no abdominal pain and no headaches. Nothing aggravates the symptoms. The symptoms are relieved by acetaminophen. She has tried acetaminophen for the symptoms. The treatment provided mild relief.         Past Medical History:   Diagnosis Date    Allergic rhinitis due to pollen     Asthma     Attention deficit disorder without mention of hyperactivity     Chronic tension headache     Contact dermatitis and other eczema, due to unspecified cause     Depressive disorder, not elsewhere classified     Dizziness and giddiness     Folliculitis     Herpetic gingivostomatitis     Hypothyroidism     Irritability     Knee pain     Low back pain     Myalgia and myositis, unspecified     Nausea alone     Other kyphoscoliosis and scoliosis     Personal history of allergy to penicillin     Raynaud's syndrome     Reflux        Past Surgical History: Procedure Laterality Date    HX HEENT  2012 2013    sinus     HX TONSIL AND ADENOIDECTOMY      INSERT PLEURAL CATHETER      drain fluid off lung age 11-9         Family History:   Problem Relation Age of Onset    Attention Deficit Hyperactivity Disorder Mother     No Known Problems Sister     No Known Problems Brother     No Known Problems Brother     Coronary Artery Disease Maternal Grandmother     Diabetes Maternal Grandmother     Diabetes Maternal Aunt     Breast Cancer Maternal Aunt     Colon Cancer Neg Hx     Ovarian Cancer Neg Hx     Uterine Cancer Neg Hx        Social History     Socioeconomic History    Marital status:      Spouse name: Not on file    Number of children: Not on file    Years of education: Not on file    Highest education level: Not on file   Occupational History    Not on file   Tobacco Use    Smoking status: Never Smoker    Smokeless tobacco: Never Used   Vaping Use    Vaping Use: Never used   Substance and Sexual Activity    Alcohol use: No    Drug use: No    Sexual activity: Yes     Partners: Male     Birth control/protection: Patch   Other Topics Concern     Service Not Asked    Blood Transfusions Not Asked    Caffeine Concern No    Occupational Exposure Not Asked    Hobby Hazards Not Asked    Sleep Concern Not Asked    Stress Concern Not Asked    Weight Concern Not Asked    Special Diet Not Asked    Back Care Not Asked    Exercise No    Bike Helmet Not Asked    Seat Belt Yes    Self-Exams No   Social History Narrative    Abuse: Feels safe at home, no history of physical abuse, no history of sexual abuse     Social Determinants of Health     Financial Resource Strain:     Difficulty of Paying Living Expenses:    Food Insecurity:     Worried About Running Out of Food in the Last Year:     Ran Out of Food in the Last Year:    Transportation Needs:     Lack of Transportation (Medical):      Lack of Transportation (Non-Medical): Physical Activity:     Days of Exercise per Week:     Minutes of Exercise per Session:    Stress:     Feeling of Stress :    Social Connections:     Frequency of Communication with Friends and Family:     Frequency of Social Gatherings with Friends and Family:     Attends Sabianist Services:     Active Member of Clubs or Organizations:     Attends Club or Organization Meetings:     Marital Status:    Intimate Partner Violence:     Fear of Current or Ex-Partner:     Emotionally Abused:     Physically Abused:     Sexually Abused: ALLERGIES: Egg; Influenza virus vaccine, specific; Peanut; Adderall [dextroamphetamine-amphetamine]; Augmentin [amoxicillin-pot clavulanate]; and Penicillins    Review of Systems   Constitutional: Positive for chills. HENT: Positive for congestion and sinus pressure. Negative for ear pain and rhinorrhea. Respiratory: Positive for cough and shortness of breath. Negative for chest tightness and wheezing. Cardiovascular: Negative for chest pain. Gastrointestinal: Positive for nausea. Negative for abdominal pain, diarrhea and vomiting. Neurological: Negative for headaches. All other systems reviewed and are negative. Vitals:    06/05/21 1734 06/05/21 1926   BP: 113/71 98/68   Pulse: (!) 104 100   Resp: 16 16   Temp: 98.7 °F (37.1 °C)    SpO2: 96% 97%   Weight: 61.2 kg (135 lb)    Height: 5' 1\" (1.549 m)             Physical Exam  Vitals and nursing note reviewed. Constitutional:       General: She is not in acute distress. Appearance: Normal appearance. She is normal weight. She is not ill-appearing, toxic-appearing or diaphoretic. HENT:      Head: Normocephalic and atraumatic. Right Ear: External ear normal.      Left Ear: External ear normal.      Nose: Congestion present. Mouth/Throat:      Mouth: Mucous membranes are moist.      Pharynx: Oropharynx is clear. Eyes:      General: No scleral icterus.      Extraocular Movements: Extraocular movements intact. Conjunctiva/sclera: Conjunctivae normal.   Cardiovascular:      Rate and Rhythm: Normal rate. Pulses: Normal pulses. Heart sounds: Normal heart sounds. Pulmonary:      Effort: Pulmonary effort is normal. No respiratory distress. Breath sounds: Normal breath sounds. No wheezing, rhonchi or rales. Abdominal:      General: Abdomen is flat. Bowel sounds are normal. There is no distension. Palpations: Abdomen is soft. Tenderness: There is no abdominal tenderness. Musculoskeletal:         General: Normal range of motion. Cervical back: Normal range of motion and neck supple. Right lower leg: No edema. Left lower leg: No edema. Skin:     General: Skin is warm and dry. Capillary Refill: Capillary refill takes less than 2 seconds. Neurological:      General: No focal deficit present. Mental Status: She is alert and oriented to person, place, and time. Psychiatric:         Mood and Affect: Mood normal.         Behavior: Behavior normal.          MDM  Number of Diagnoses or Management Options  Acute non-recurrent maxillary sinusitis  Cough  Diagnosis management comments: 17-year-old female presents emergency department today with chief complaint cough. Patient reports some subjective fevers, nausea, and body aches. Physical exam without any abnormal findings. Lung sounds are clear. No wheezing or adventitious lung sounds. Patient appears in no distress. Vital signs stable in the emergency department. Patient afebrile in the emergency department. Chest x-ray was read as recurrent infiltrate versus scarring in the left lung base. When compared to previous x-ray in October, x-ray appears with no acute changes. Have offered patient Covid test today however patient declines. Symptoms likely viral etiology. Will treat with Claritin-D, Phenergan, and albuterol inhaler as needed.  I have discussed the results of all labs, procedures, radiographs, and/or treatments with the patient and available family members. Lawrance Logan is agreed upon by the patient and the patient is ready for discharge.  Questions about treatment in the ED and differential diagnosis of presenting condition were answered. Daxa Giles was given verbal discharge instructions including, but not limited to, importance of returning to the emergency department for any concern of worsening or continued symptoms.  Instructions were given to follow up with a primary care provider or specialist within 1-2 days. Yee Dom effects of medications, if prescribed, were discussed and patient was advised to refrain from significant physical activity until followed up by primary care physician and to not drive or operate heavy machinery after taking any sedating substances.      Voice dictation software was used during the making of this note.  This software is not perfect and grammatical and other typographical errors may be present.  This note has been proofread, but may still contain errors. Amount and/or Complexity of Data Reviewed  Tests in the radiology section of CPT®: reviewed    Risk of Complications, Morbidity, and/or Mortality  Presenting problems: moderate  Diagnostic procedures: moderate  Management options: moderate    Patient Progress  Patient progress: improved    ED Course as of Jun 05 1929   Sat Jun 05, 2021   1841 IMPRESSION  Recurrent infiltrate versus scarring in the left lung base.    XR CHEST PA LAT [BC]      ED Course User Index  [BC] Hussein Garcia NP       Procedures

## 2022-01-11 ENCOUNTER — HOSPITAL ENCOUNTER (OUTPATIENT)
Dept: ULTRASOUND IMAGING | Age: 27
Discharge: HOME | End: 2022-01-11
Attending: PHYSICIAN ASSISTANT
Payer: COMMERCIAL

## 2022-01-11 DIAGNOSIS — L65.9 HAIR LOSS: ICD-10-CM

## 2022-01-11 DIAGNOSIS — E03.9 ACQUIRED HYPOTHYROIDISM: ICD-10-CM

## 2022-01-11 DIAGNOSIS — R13.13 PHARYNGEAL DYSPHAGIA: ICD-10-CM

## 2022-01-11 PROCEDURE — 76536 US EXAM OF HEAD AND NECK: CPT

## 2022-03-19 PROBLEM — Z00.8 ENCOUNTER FOR SPECIALIZED MEDICAL EXAMINATION: Status: ACTIVE | Noted: 2020-07-08

## 2022-03-19 PROBLEM — R39.15 URINARY URGENCY: Status: ACTIVE | Noted: 2020-07-08

## 2022-03-19 PROBLEM — F32.A ANXIETY AND DEPRESSION: Status: ACTIVE | Noted: 2019-04-26

## 2022-03-19 PROBLEM — F41.9 ANXIETY AND DEPRESSION: Status: ACTIVE | Noted: 2019-04-26

## 2022-03-19 PROBLEM — E07.9 THYROID DISORDER: Status: ACTIVE | Noted: 2018-10-31

## 2022-03-20 PROBLEM — Z30.09 BIRTH CONTROL COUNSELING: Status: ACTIVE | Noted: 2018-12-13

## 2022-04-20 PROBLEM — R41.840 IMPAIRED CONCENTRATION: Status: ACTIVE | Noted: 2022-04-20

## 2022-04-20 PROBLEM — E03.9 ACQUIRED HYPOTHYROIDISM: Status: ACTIVE | Noted: 2022-04-20

## 2022-04-20 PROBLEM — D50.9 IRON DEFICIENCY ANEMIA: Status: ACTIVE | Noted: 2022-04-20

## 2022-04-20 PROBLEM — R53.83 FATIGUE: Status: ACTIVE | Noted: 2022-04-20

## 2022-05-11 DIAGNOSIS — D50.9 IRON DEFICIENCY ANEMIA, UNSPECIFIED IRON DEFICIENCY ANEMIA TYPE: ICD-10-CM

## 2022-05-11 DIAGNOSIS — E03.9 ACQUIRED HYPOTHYROIDISM: Primary | ICD-10-CM

## 2022-05-27 ENCOUNTER — TELEMEDICINE (OUTPATIENT)
Dept: FAMILY MEDICINE CLINIC | Facility: CLINIC | Age: 27
End: 2022-05-27

## 2022-05-27 DIAGNOSIS — Z91.199 NO-SHOW FOR APPOINTMENT: Primary | ICD-10-CM

## 2022-06-03 ENCOUNTER — NURSE ONLY (OUTPATIENT)
Dept: FAMILY MEDICINE CLINIC | Facility: CLINIC | Age: 27
End: 2022-06-03

## 2022-06-03 DIAGNOSIS — D50.9 IRON DEFICIENCY ANEMIA, UNSPECIFIED IRON DEFICIENCY ANEMIA TYPE: ICD-10-CM

## 2022-06-03 DIAGNOSIS — E03.9 ACQUIRED HYPOTHYROIDISM: ICD-10-CM

## 2022-06-03 LAB
BASOPHILS # BLD: 0.1 K/UL (ref 0–0.2)
BASOPHILS NFR BLD: 1 % (ref 0–2)
DIFFERENTIAL METHOD BLD: ABNORMAL
EOSINOPHIL # BLD: 0.6 K/UL (ref 0–0.8)
EOSINOPHIL NFR BLD: 12 % (ref 0.5–7.8)
ERYTHROCYTE [DISTWIDTH] IN BLOOD BY AUTOMATED COUNT: 11.7 % (ref 11.9–14.6)
HCT VFR BLD AUTO: 40.4 % (ref 35.8–46.3)
HGB BLD-MCNC: 12.9 G/DL (ref 11.7–15.4)
IMM GRANULOCYTES # BLD AUTO: 0 K/UL (ref 0–0.5)
IMM GRANULOCYTES NFR BLD AUTO: 0 % (ref 0–5)
IRON SERPL-MCNC: 87 UG/DL (ref 35–150)
LYMPHOCYTES # BLD: 1.4 K/UL (ref 0.5–4.6)
LYMPHOCYTES NFR BLD: 28 % (ref 13–44)
MCH RBC QN AUTO: 30.8 PG (ref 26.1–32.9)
MCHC RBC AUTO-ENTMCNC: 31.9 G/DL (ref 31.4–35)
MCV RBC AUTO: 96.4 FL (ref 79.6–97.8)
MONOCYTES # BLD: 0.4 K/UL (ref 0.1–1.3)
MONOCYTES NFR BLD: 7 % (ref 4–12)
NEUTS SEG # BLD: 2.5 K/UL (ref 1.7–8.2)
NEUTS SEG NFR BLD: 52 % (ref 43–78)
NRBC # BLD: 0 K/UL (ref 0–0.2)
PLATELET # BLD AUTO: 304 K/UL (ref 150–450)
PMV BLD AUTO: 11.5 FL (ref 9.4–12.3)
RBC # BLD AUTO: 4.19 M/UL (ref 4.05–5.2)
T4 FREE SERPL-MCNC: 1.1 NG/DL (ref 0.9–1.8)
TSH, 3RD GENERATION: 1.95 UIU/ML (ref 0.36–3.74)
WBC # BLD AUTO: 4.9 K/UL (ref 4.3–11.1)

## 2022-06-08 RX ORDER — OSELTAMIVIR PHOSPHATE 75 MG/1
75 CAPSULE ORAL 2 TIMES DAILY
Qty: 10 CAPSULE | Refills: 0 | Status: SHIPPED | OUTPATIENT
Start: 2022-06-08 | End: 2022-06-09 | Stop reason: ALTCHOICE

## 2022-06-08 NOTE — TELEPHONE ENCOUNTER
Josefa Diane PA-C 12 hours ago (9:04 PM)           Both of my kids tested pianoforte the flu yesterday and now Im starting to have symptoms. Do I need to come in or can you just call me in some medicine to [facility]?

## 2022-06-08 NOTE — TELEPHONE ENCOUNTER
Let her know I sent in an order for tamilfu to start immediately but we can work in a vv if she wants

## 2022-06-09 RX ORDER — FLUOXETINE 10 MG/1
10 CAPSULE ORAL DAILY
Qty: 90 CAPSULE | Refills: 1 | Status: CANCELLED | OUTPATIENT
Start: 2022-06-09

## 2022-06-09 SDOH — ECONOMIC STABILITY: FOOD INSECURITY: WITHIN THE PAST 12 MONTHS, YOU WORRIED THAT YOUR FOOD WOULD RUN OUT BEFORE YOU GOT MONEY TO BUY MORE.: NEVER TRUE

## 2022-06-09 SDOH — ECONOMIC STABILITY: FOOD INSECURITY: WITHIN THE PAST 12 MONTHS, THE FOOD YOU BOUGHT JUST DIDN'T LAST AND YOU DIDN'T HAVE MONEY TO GET MORE.: NEVER TRUE

## 2022-06-09 ASSESSMENT — PATIENT HEALTH QUESTIONNAIRE - PHQ9
SUM OF ALL RESPONSES TO PHQ QUESTIONS 1-9: 6
9. THOUGHTS THAT YOU WOULD BE BETTER OFF DEAD, OR OF HURTING YOURSELF: 0
SUM OF ALL RESPONSES TO PHQ QUESTIONS 1-9: 6
SUM OF ALL RESPONSES TO PHQ QUESTIONS 1-9: 6
7. TROUBLE CONCENTRATING ON THINGS, SUCH AS READING THE NEWSPAPER OR WATCHING TELEVISION: 1
SUM OF ALL RESPONSES TO PHQ9 QUESTIONS 1 & 2: 2
SUM OF ALL RESPONSES TO PHQ QUESTIONS 1-9: 6
8. MOVING OR SPEAKING SO SLOWLY THAT OTHER PEOPLE COULD HAVE NOTICED. OR THE OPPOSITE, BEING SO FIGETY OR RESTLESS THAT YOU HAVE BEEN MOVING AROUND A LOT MORE THAN USUAL: 0
2. FEELING DOWN, DEPRESSED OR HOPELESS: 1
3. TROUBLE FALLING OR STAYING ASLEEP: 1
1. LITTLE INTEREST OR PLEASURE IN DOING THINGS: 1
5. POOR APPETITE OR OVEREATING: 1
6. FEELING BAD ABOUT YOURSELF - OR THAT YOU ARE A FAILURE OR HAVE LET YOURSELF OR YOUR FAMILY DOWN: 0
10. IF YOU CHECKED OFF ANY PROBLEMS, HOW DIFFICULT HAVE THESE PROBLEMS MADE IT FOR YOU TO DO YOUR WORK, TAKE CARE OF THINGS AT HOME, OR GET ALONG WITH OTHER PEOPLE: 1
4. FEELING TIRED OR HAVING LITTLE ENERGY: 1

## 2022-06-09 ASSESSMENT — SOCIAL DETERMINANTS OF HEALTH (SDOH): HOW HARD IS IT FOR YOU TO PAY FOR THE VERY BASICS LIKE FOOD, HOUSING, MEDICAL CARE, AND HEATING?: NOT HARD AT ALL

## 2022-06-10 ENCOUNTER — TELEMEDICINE (OUTPATIENT)
Dept: FAMILY MEDICINE CLINIC | Facility: CLINIC | Age: 27
End: 2022-06-10
Payer: COMMERCIAL

## 2022-06-10 ENCOUNTER — TELEPHONE (OUTPATIENT)
Dept: FAMILY MEDICINE CLINIC | Facility: CLINIC | Age: 27
End: 2022-06-10

## 2022-06-10 DIAGNOSIS — F32.A ANXIETY AND DEPRESSION: ICD-10-CM

## 2022-06-10 DIAGNOSIS — F41.9 ANXIETY AND DEPRESSION: ICD-10-CM

## 2022-06-10 DIAGNOSIS — J45.40 MODERATE PERSISTENT ASTHMA WITHOUT COMPLICATION: Primary | ICD-10-CM

## 2022-06-10 PROCEDURE — 99214 OFFICE O/P EST MOD 30 MIN: CPT | Performed by: PHYSICIAN ASSISTANT

## 2022-06-10 RX ORDER — DESVENLAFAXINE 50 MG/1
50 TABLET, EXTENDED RELEASE ORAL DAILY
Qty: 90 TABLET | Refills: 3 | Status: SHIPPED | OUTPATIENT
Start: 2022-06-10 | End: 2022-07-27 | Stop reason: SDUPTHER

## 2022-06-10 RX ORDER — ALBUTEROL SULFATE 90 UG/1
2 AEROSOL, METERED RESPIRATORY (INHALATION) EVERY 4 HOURS PRN
Qty: 18 G | Refills: 1 | Status: SHIPPED | OUTPATIENT
Start: 2022-06-10 | End: 2022-07-27 | Stop reason: SDUPTHER

## 2022-06-10 RX ORDER — LEVOTHYROXINE SODIUM 25 UG/1
25 CAPSULE ORAL DAILY
Qty: 90 CAPSULE | Refills: 1 | Status: SHIPPED | OUTPATIENT
Start: 2022-06-10 | End: 2022-07-27 | Stop reason: SDUPTHER

## 2022-06-10 NOTE — PROGRESS NOTES
Patient: Jeniffer Mcdonald  YOB: 1995  Patient Age 32 y.o. Patient sex: female  Medical Record:  [id number]  Visit Date: 6/10/2022  Author:  Leonie Sandoval [facility] Virtual  Visit Note Video Conference Note  Location: To Patients electronic /phone device in their home  From Medical provider     Jeniffer Mcdonald is a 32 y.o. y.o. female  being evaluated by a Virtual Visit (video visit) encounter to address concerns. A caregiver was present when appropriate. Due to this being a TeleHealth encounter (During [facility] public health emergency), evaluation of the following organ systems was limited: Vitals/Constitutional/EENT/Resp/CV/GI//MS/Neuro/Skin/Heme-Lymph-Imm. Pursuant to the emergency declaration under the [address] [place] [address], [address] [place] [address] authority and the [employer] and [employer] Act, this Virtual Visit was conducted with patient's (and/or legal guardian's) consent, to reduce the risk of exposure to COVID-19 and provide necessary medical care.     Consent:  The patient and/or health care decision maker is aware that that they may receive a bill for this audio-video service, depending on his insurance coverage, and has provided verbal consent to proceed: Yes    Chief Complaint  Jeniffer Mcdonald  is a 32 y.o. female who was seen by synchronous (real-time) audio-video technology on 06/12/22  7:31 PM  for the following reasons:    Chief Complaint   Patient presents with   Get[employer]Guide    Medication Management     prozac a month ago-seems to be working could be increased    Weight Loss     patient needs to be taken off the wellburtin she is losing weight and having toruble gaining        History of Present Illness:  Depression  She tried trintellix and that didn't work and did prozac and by itself but it didn't help so we added wellbutrin and that worked well but still finds herself unmotivated but can't put on weight    Asthma - she normally takes dulera and rarely needs albuterol. Off the [employer] Glenda is using albuterol at least daiy and at times more than once a dayNeeds prior authorization [facility] at [facility]  Allergies: Allergies   Allergen Reactions    Albumen, Egg Anaphylaxis    Amoxicillin-Pot Clavulanate Rash    Penicillins Rash       Current Medications:   Current Outpatient Medications   Medication Sig Dispense Refill    Levothyroxine Sodium 25 MCG CAPS Take 25 mcg by mouth daily 90 capsule 1    desvenlafaxine succinate (PRISTIQ) 50 MG TB24 extended release tablet Take 1 tablet by mouth daily 90 tablet 3    albuterol sulfate HFA (PROVENTIL;VENTOLIN;PROAIR) 108 (90 Base) MCG/ACT inhaler Inhale 2 puffs into the lungs every 4 hours as needed for Shortness of Breath 18 g 1    buPROPion (WELLBUTRIN) 100 MG tablet Take 150 mg by mouth 2 times daily      cetirizine (ZYRTEC) 5 MG tablet Take 5 mg by mouth daily      Ferrous Sulfate 324 MG TBEC Take by mouth every morning (before breakfast)      FLUoxetine (PROZAC) 10 MG capsule Take 10 mg by mouth daily      hydrocortisone valerate (WEST-DANK) 0.2 % ointment Apply topically 2 times daily      triamcinolone (ARISTOCORT) 0.5 % cream Apply topically 2 times daily      mometasone-formoterol (DULERA) 200-5 MCG/ACT inhaler Inhale 2 puffs into the lungs 2 times daily 1 each 3     No current facility-administered medications for this visit.          Current Problem List:   Patient Active Problem List   Diagnosis    Asthma    Raynaud's syndrome    Thyroid disorder    Allergic rhinitis due to pollen    Anxiety and depression    Urinary urgency    Encounter for specialized medical examination    Birth control counseling    Acquired hypothyroidism    Iron deficiency anemia    Fatigue       Social History:   Social History     Tobacco Use    Smoking status: Never Smoker    Smokeless tobacco: Never Used   Substance Use Topics    Alcohol use: No       Family History:   Family History   Problem Relation Age of Onset    Colon Cancer Neg Hx     Breast Cancer Maternal Aunt     Uterine Cancer Neg Hx     Ovarian Cancer Neg Hx     Diabetes Maternal Aunt     Diabetes Maternal Grandmother     Coronary Art Dis Maternal Grandmother     No Known Problems Brother     No Known Problems Brother     No Known Problems Sister     ADHD Mother        Surgical History:  Past Surgical History:   Procedure Laterality Date    HEENT  2012 2013    sinus     INSERT PLEURAL CATHETER      drain fluid off lung age 11-9    RHINOPLASTY  03/01/2022    balloon and polyps removal    TONSILLECTOMY AND ADENOIDECTOMY         ROS  Review of Systems   Constitutional: Negative for fever. Respiratory: Negative for shortness of breath. Cardiovascular: Negative for chest pain. Neurological: Negative for syncope. Vitals:  those vailabe due to teleconference  are noted below provided by the patient's device in their home     Patient-Reported Vitals 6/10/2022   Patient-Reported Weight 117lbs   Patient-Reported Height -       There is no height or weight on file to calculate BMI. Physical Exam    Vitals reviewed. Constitutional:       Appearance: Normal appearance. HENT:      Head: Atraumatic. Eyes:      Conjunctiva/sclera: Conjunctivae normal.   Pulmonary:      Effort: Pulmonary effort is normal.   Musculoskeletal:      Cervical back: Neck supple. Skin:     Coloration: Skin is not jaundiced or pale. Neurological:      General: No focal deficit present. Mental Status: He is alert. Psychiatric:         Mood and Affect: Mood normal.       ASSESSMENT & PLAN    ICD-10-CM    1. Moderate persistent asthma without complication  Q72.03    2. Anxiety and depression  F41.9     F32. A         Diagnoses and all orders for this visit:  Moderate persistent asthma without complication  Anxiety and depression  Other orders  -     Levothyroxine Sodium 25 MCG CAPS;  Take 25 mcg by mouth daily  -     desvenlafaxine succinate (PRISTIQ) 50 MG TB24 extended release tablet; Take 1 tablet by mouth daily  -     albuterol sulfate HFA (PROVENTIL;VENTOLIN;PROAIR) 108 (90 Base) MCG/ACT inhaler; Inhale 2 puffs into the lungs every 4 hours as needed for Shortness of Breath         No orders of the defined types were placed in this encounter. I have reviewed the patient's past medical history, social history and family history and vitals. We have discussed treatment plan and follow up and given patient instructions. Patient's questions are answered and we will follow up as indicated. Follow-up and Dispositions    · Return in about 6 weeks (around 7/22/2022). Patient advised to call the office to arrange the follow up apt discussed today -advised to return in  6 weeks or sooner if symptoms worsen. We discussed the expected course, resolution and complications of the diagnosis(es) in detail. Medication risks, benefits, costs, interactions, and alternatives were discussed as indicated. I advised him to contact the office if his condition worsens, changes or fails to improve as anticipated. He expressed understanding with the diagnosis(es) and plan. Pursuant to the emergency declaration under the [facility] waiver authority and the [employer] and [employer] Act, this Virtual  Visit was conducted, with patient's consent, to reduce the patient's risk of exposure to COVID-19 and provide continuity of care for an established patient. Services were provided through a video synchronous discussion -- on [employer] [place] virtually to substitute for in-person clinic visit  Return in about 6 weeks (around 7/22/2022). Josefa Cutler PA-C  7:31 PM  6/12/2022

## 2022-06-10 NOTE — PATIENT INSTRUCTIONS
To taper off  For three days only take morning dose of wellbutrin stop afternoon dose  Then for for 3 days take half a tablet  Stop prozac  Start pristiq the following morning

## 2022-06-10 NOTE — TELEPHONE ENCOUNTER
Needs prior authorization ann-marie at [address]   can you put a sample up front for her and do a  pa

## 2022-06-23 ENCOUNTER — TELEPHONE (OUTPATIENT)
Dept: FAMILY MEDICINE CLINIC | Facility: CLINIC | Age: 27
End: 2022-06-23

## 2022-06-23 NOTE — TELEPHONE ENCOUNTER
LVM that I can not do a PA for [facility] as cover my meds can not find her with insurance ID or the prescription card we have on file which is from 2017 & her last name on the card was Ofe.  Asked patient to bring in a new RX card if she has one & to make sure her insurance info is correct

## 2022-07-27 ENCOUNTER — TELEMEDICINE (OUTPATIENT)
Dept: FAMILY MEDICINE CLINIC | Facility: CLINIC | Age: 27
End: 2022-07-27
Payer: COMMERCIAL

## 2022-07-27 DIAGNOSIS — F41.9 ANXIETY AND DEPRESSION: ICD-10-CM

## 2022-07-27 DIAGNOSIS — E03.9 ACQUIRED HYPOTHYROIDISM: ICD-10-CM

## 2022-07-27 DIAGNOSIS — J45.40 MODERATE PERSISTENT ASTHMA WITHOUT COMPLICATION: ICD-10-CM

## 2022-07-27 DIAGNOSIS — L30.9 ECZEMA, UNSPECIFIED TYPE: Primary | ICD-10-CM

## 2022-07-27 DIAGNOSIS — F32.A ANXIETY AND DEPRESSION: ICD-10-CM

## 2022-07-27 DIAGNOSIS — F32.A ANXIETY AND DEPRESSION: Primary | ICD-10-CM

## 2022-07-27 DIAGNOSIS — F41.9 ANXIETY AND DEPRESSION: Primary | ICD-10-CM

## 2022-07-27 PROCEDURE — 99214 OFFICE O/P EST MOD 30 MIN: CPT | Performed by: PHYSICIAN ASSISTANT

## 2022-07-27 RX ORDER — DESVENLAFAXINE 50 MG/1
100 TABLET, EXTENDED RELEASE ORAL DAILY
Qty: 90 TABLET | Refills: 1 | Status: SHIPPED | OUTPATIENT
Start: 2022-07-27 | End: 2022-07-28

## 2022-07-27 RX ORDER — LEVOTHYROXINE SODIUM 25 UG/1
25 CAPSULE ORAL DAILY
Qty: 90 CAPSULE | Refills: 1 | Status: SHIPPED | OUTPATIENT
Start: 2022-07-27 | End: 2022-10-13 | Stop reason: SDUPTHER

## 2022-07-27 RX ORDER — ALBUTEROL SULFATE 90 UG/1
2 AEROSOL, METERED RESPIRATORY (INHALATION) EVERY 4 HOURS PRN
Qty: 18 G | Refills: 1 | Status: SHIPPED | OUTPATIENT
Start: 2022-07-27 | End: 2022-10-13 | Stop reason: SDUPTHER

## 2022-07-27 ASSESSMENT — PATIENT HEALTH QUESTIONNAIRE - PHQ9
6. FEELING BAD ABOUT YOURSELF - OR THAT YOU ARE A FAILURE OR HAVE LET YOURSELF OR YOUR FAMILY DOWN: 0
SUM OF ALL RESPONSES TO PHQ9 QUESTIONS 1 & 2: 0
7. TROUBLE CONCENTRATING ON THINGS, SUCH AS READING THE NEWSPAPER OR WATCHING TELEVISION: 0
3. TROUBLE FALLING OR STAYING ASLEEP: 0
SUM OF ALL RESPONSES TO PHQ QUESTIONS 1-9: 0
2. FEELING DOWN, DEPRESSED OR HOPELESS: 0
1. LITTLE INTEREST OR PLEASURE IN DOING THINGS: 0
4. FEELING TIRED OR HAVING LITTLE ENERGY: 0
SUM OF ALL RESPONSES TO PHQ QUESTIONS 1-9: 0
8. MOVING OR SPEAKING SO SLOWLY THAT OTHER PEOPLE COULD HAVE NOTICED. OR THE OPPOSITE, BEING SO FIGETY OR RESTLESS THAT YOU HAVE BEEN MOVING AROUND A LOT MORE THAN USUAL: 0
5. POOR APPETITE OR OVEREATING: 0
SUM OF ALL RESPONSES TO PHQ QUESTIONS 1-9: 0
9. THOUGHTS THAT YOU WOULD BE BETTER OFF DEAD, OR OF HURTING YOURSELF: 0
SUM OF ALL RESPONSES TO PHQ QUESTIONS 1-9: 0

## 2022-07-27 ASSESSMENT — ANXIETY QUESTIONNAIRES
6. BECOMING EASILY ANNOYED OR IRRITABLE: 0
1. FEELING NERVOUS, ANXIOUS, OR ON EDGE: 0
7. FEELING AFRAID AS IF SOMETHING AWFUL MIGHT HAPPEN: 0
2. NOT BEING ABLE TO STOP OR CONTROL WORRYING: 0
GAD7 TOTAL SCORE: 0
5. BEING SO RESTLESS THAT IT IS HARD TO SIT STILL: 0
3. WORRYING TOO MUCH ABOUT DIFFERENT THINGS: 0
4. TROUBLE RELAXING: 0

## 2022-07-27 NOTE — PROGRESS NOTES
Patient: Giovanni Perales  YOB: 1995  Patient Age 32 y.o. Patient sex: female  Medical Record:  [id number]  Visit Date: 7/27/2022  Author:  Malcolm Bassett. Huma Valverde    [facility] Virtual  Visit Note Video Conference Note  Location: To Patients electronic /phone device in their home  From Medical provider     Giovanni Perales is a 32 y.o. y.o. female  being evaluated by a Virtual Visit (video visit) encounter to address concerns. A caregiver was present when appropriate. Due to this being a TeleHealth encounter (During [facility] emergency), evaluation of the following organ systems was limited: Vitals/Constitutional/EENT/Resp/CV/GI//MS/Neuro/Skin/Heme-Lymph-Imm. Pursuant to the emergency declaration under the [address] [place] [address], [address] [place] [address] authority and the [employer] and [employer] Act, this Virtual Visit was conducted with patient's (and/or legal guardian's) consent, to reduce the risk of exposure to COVID-19 and provide necessary medical care. Consent:  The patient and/or health care decision maker is aware that that they may receive a bill for this audio-video service, depending on his insurance coverage, and has provided verbal consent to proceed: Yes    Chief Complaint  Giovanni Perales  is a 32 y.o. female who was seen by synchronous (real-time) audio-video technology on 07/27/22  9:38 AM  for the following reasons:    Chief Complaint   Patient presents with    Follow-up        History of Present Illness:  Kenna Kirkpatrick is a 80-year-old female who presents today for follow-up she has a history of Raynaud's syndrome, allergic rhinitis, hypothyroidism, anxiety, iron deficiency anemia. She last had labs done on 6/3/2022. TSH was 1.950 Free T4 was 1.1 CBC noted hemoglobin hematocrit were stable at 12.9/40.4. Iron was stable at 87. Patient returns for follow-up today.     Hypothyroidism patient is currently treated with levothyroxine 25 mics 1 tablet daily which she is taking and reports current symptoms are no constipation, no feeling cold and fatigue has resolve    Anxiety depression patient is currently treated with Pristiq 50 mg and reports symptoms are-- its helping and still has a few days she still feels down . she denies any suicidal thoughts    Asthma patient is currently treated with Dulera 200s-5 mcg and albuterol she reports that she is using albuterol-- she is taking dulera daily and uses albuterol only once every couple  weeks-- she is also currently on Zyrtec to help manage allergies. Allergies: Allergies   Allergen Reactions    Albumen, Egg Anaphylaxis    Amoxicillin-Pot Clavulanate Rash    Penicillins Rash       Current Medications:   Current Outpatient Medications   Medication Sig Dispense Refill    desvenlafaxine succinate (PRISTIQ) 50 MG TB24 extended release tablet Take 2 tablets by mouth in the morning. 90 tablet 1    albuterol sulfate HFA (PROVENTIL;VENTOLIN;PROAIR) 108 (90 Base) MCG/ACT inhaler Inhale 2 puffs into the lungs every 4 hours as needed for Shortness of Breath 18 g 1    Levothyroxine Sodium 25 MCG CAPS Take 25 mcg by mouth daily 90 capsule 1    mometasone-formoterol (DULERA) 200-5 MCG/ACT inhaler Inhale 2 puffs into the lungs in the morning and 2 puffs before bedtime. 1 each 3    hydrocortisone (WESTCORT) 0.2 % cream Apply topically 2 times daily to face 45 g 1    cetirizine (ZYRTEC) 5 MG tablet Take 5 mg by mouth daily      Ferrous Sulfate 324 MG TBEC Take by mouth every morning (before breakfast)      hydrocortisone valerate (WEST-DANK) 0.2 % ointment Apply topically 2 times daily      triamcinolone (ARISTOCORT) 0.5 % cream Apply topically 2 times daily       No current facility-administered medications for this visit.          Current Problem List:   Patient Active Problem List   Diagnosis    Asthma    Raynaud's syndrome    Thyroid disorder    Allergic rhinitis due to pollen    Anxiety and depression    Urinary urgency    Encounter for specialized medical examination    Birth control counseling    Acquired hypothyroidism    Iron deficiency anemia    Fatigue       Social History:   Social History     Tobacco Use    Smoking status: Never    Smokeless tobacco: Never   Substance Use Topics    Alcohol use: No       Family History:   Family History   Problem Relation Age of Onset    Colon Cancer Neg Hx     Breast Cancer Maternal Aunt     Uterine Cancer Neg Hx     Ovarian Cancer Neg Hx     Diabetes Maternal Aunt     Diabetes Maternal Grandmother     Coronary Art Dis Maternal Grandmother     No Known Problems Brother     No Known Problems Brother     No Known Problems Sister     ADHD Mother        Surgical History:  Past Surgical History:   Procedure Laterality Date    HEENT  2012 2013    sinus     INSERT PLEURAL CATHETER      drain fluid off lung age 7-8    RHINOPLASTY  03/01/2022    balloon and polyps removal    TONSILLECTOMY AND ADENOIDECTOMY         ROS  Review of Systems   Constitutional: Negative for fever. Respiratory: Negative for shortness of breath. Cardiovascular: Negative for chest pain. Neurological: Negative for syncope. Vitals:  those vailabe due to teleconference  are noted below provided by the patient's device in their home     Patient-Reported Vitals 7/27/2022   Patient-Reported Weight 5'1   Patient-Reported Height 135 lbs       There is no height or weight on file to calculate BMI. Physical Exam    Vitals reviewed. Constitutional:       Appearance: Normal appearance. HENT:      Head: Atraumatic. Eyes:      Conjunctiva/sclera: Conjunctivae normal.   Pulmonary:      Effort: Pulmonary effort is normal.   Musculoskeletal:      Cervical back: Neck supple. Skin:     Coloration: Skin is not jaundiced or pale. Neurological:      General: No focal deficit present. Mental Status: He is alert.    Psychiatric:         Mood and Affect: Mood normal.       ASSESSMENT & PLAN    ICD-10-CM    1. Eczema, unspecified type  L30.9 hydrocortisone (WESTCORT) 0.2 % cream      2. Anxiety and depression  F41.9 desvenlafaxine succinate (PRISTIQ) 50 MG TB24 extended release tablet    F32. A       3. Moderate persistent asthma without complication  P55.39 albuterol sulfate HFA (PROVENTIL;VENTOLIN;PROAIR) 108 (90 Base) MCG/ACT inhaler     mometasone-formoterol (DULERA) 200-5 MCG/ACT inhaler      4. Acquired hypothyroidism  E03.9 Levothyroxine Sodium 25 MCG CAPS           Diagnoses and all orders for this visit:  Eczema, unspecified type  -     hydrocortisone (WESTCORT) 0.2 % cream; Apply topically 2 times daily to face  Anxiety and depression  -     desvenlafaxine succinate (PRISTIQ) 50 MG TB24 extended release tablet; Take 2 tablets by mouth in the morning. Moderate persistent asthma without complication  -     albuterol sulfate HFA (PROVENTIL;VENTOLIN;PROAIR) 108 (90 Base) MCG/ACT inhaler; Inhale 2 puffs into the lungs every 4 hours as needed for Shortness of Breath  -     mometasone-formoterol (DULERA) 200-5 MCG/ACT inhaler; Inhale 2 puffs into the lungs in the morning and 2 puffs before bedtime. Acquired hypothyroidism  -     Levothyroxine Sodium 25 MCG CAPS; Take 25 mcg by mouth daily       No orders of the defined types were placed in this encounter. I have reviewed the patient's past medical history, social history and family history and vitals. We have discussed treatment plan and follow up and given patient instructions. Patient's questions are answered and we will follow up as indicated. Patient advised to call the office to arrange the follow up apt discussed today -advised to return in  6 months or sooner if symptoms worsen. We discussed the expected course, resolution and complications of the diagnosis(es) in detail. Medication risks, benefits, costs, interactions, and alternatives were discussed as indicated.   I advised him to contact the office if his condition worsens, changes or fails to improve as anticipated. He expressed understanding with the diagnosis(es) and plan. Pursuant to the emergency declaration under the [address] [place] [address], [facility] waiver authority and the [employer] Act, this Virtual  Visit was conducted, with patient's consent, to reduce the patient's risk of exposure to COVID-19 and provide continuity of care for an established patient. Services were provided through a video synchronous discussion -- on [employer] [place] virtually to substitute for in-person clinic visit  No follow-ups on file. Josefa Torres PA-C  9:38 AM  7/27/2022

## 2022-07-27 NOTE — PATIENT INSTRUCTIONS
If you would like to follow me.   Starting Sept 1st, 2022  I will be located at :  [address] [facility] with [address] [facility]  [address] [facility] [address]  Phone is [phone]

## 2022-07-27 NOTE — TELEPHONE ENCOUNTER
Pharmacy called and patients insurance will not pay for Pristique 50mg 2 tablets daily. It will need to be changed to 100mg once daily. Can you send in new rx?

## 2022-07-28 RX ORDER — DESVENLAFAXINE 100 MG/1
100 TABLET, EXTENDED RELEASE ORAL DAILY
Qty: 90 TABLET | Refills: 1 | Status: SHIPPED | OUTPATIENT
Start: 2022-07-28 | End: 2022-10-13 | Stop reason: SDUPTHER

## 2022-08-01 ENCOUNTER — TELEPHONE (OUTPATIENT)
Dept: FAMILY MEDICINE CLINIC | Facility: CLINIC | Age: 27
End: 2022-08-01

## 2022-08-18 ENCOUNTER — PATIENT MESSAGE (OUTPATIENT)
Dept: FAMILY MEDICINE CLINIC | Facility: CLINIC | Age: 27
End: 2022-08-18

## 2022-08-22 RX ORDER — PREDNISONE 10 MG/1
TABLET ORAL
Qty: 12 TABLET | Refills: 0 | Status: SHIPPED | OUTPATIENT
Start: 2022-08-22 | End: 2022-10-13

## 2022-08-22 NOTE — TELEPHONE ENCOUNTER
From: Miguel Marcano  To: Elzbieta Gottlieb  Sent: 8/18/2022 2:22 PM EDT  Subject: Asthma     Is there anyway you can call me in some   Prednisone? Im having a flare up with my asthma Ive been having to take me rescue inhaler for the past two days and I have a little croupy cough.

## 2022-10-03 ENCOUNTER — TELEPHONE (OUTPATIENT)
Dept: FAMILY MEDICINE CLINIC | Facility: CLINIC | Age: 27
End: 2022-10-03

## 2022-10-03 DIAGNOSIS — E03.9 ACQUIRED HYPOTHYROIDISM: ICD-10-CM

## 2022-10-03 DIAGNOSIS — R53.83 OTHER FATIGUE: Primary | ICD-10-CM

## 2022-10-03 DIAGNOSIS — D50.9 IRON DEFICIENCY ANEMIA, UNSPECIFIED IRON DEFICIENCY ANEMIA TYPE: ICD-10-CM

## 2022-10-03 NOTE — TELEPHONE ENCOUNTER
----- Message from Jennifer Perez sent at 10/3/2022 11:42 AM EDT -----  Subject: Referral Request    Reason for referral request? Thyroid lab work   Provider patient wants to be referred to(if known):     Provider Phone Number(if known): Additional Information for Provider? Patient is a patient of Dr. Fredis Obrien   and she is following her to this office. Patient advised she is due for   her lab work and will need to schedule a follow up visit for after this   lab work appointment. Patient has been feeling tired having headaches and   no energy and would like to have this labs drawn for this and to discuss   after the lab work.  Please advise.   ---------------------------------------------------------------------------  --------------  Debbie Parker ID    [id number]; OK to leave message on voicemail  ---------------------------------------------------------------------------  --------------

## 2022-10-04 ENCOUNTER — PATIENT MESSAGE (OUTPATIENT)
Dept: FAMILY MEDICINE CLINIC | Facility: CLINIC | Age: 27
End: 2022-10-04

## 2022-10-04 DIAGNOSIS — F32.A ANXIETY AND DEPRESSION: Primary | ICD-10-CM

## 2022-10-04 DIAGNOSIS — F41.9 ANXIETY AND DEPRESSION: Primary | ICD-10-CM

## 2022-10-04 NOTE — TELEPHONE ENCOUNTER
From: Anna Reis  To: Dagoberto Avendaño  Sent: 10/4/2022 11:38 AM EDT  Subject: labs    Labs are ordered in chart. Please call to schedule lab and follow up  Humberto Mills.  Julian Swartz PA-C

## 2022-10-06 DIAGNOSIS — D50.9 IRON DEFICIENCY ANEMIA, UNSPECIFIED IRON DEFICIENCY ANEMIA TYPE: ICD-10-CM

## 2022-10-06 DIAGNOSIS — R53.83 OTHER FATIGUE: ICD-10-CM

## 2022-10-06 DIAGNOSIS — E03.9 ACQUIRED HYPOTHYROIDISM: ICD-10-CM

## 2022-10-07 LAB
25(OH)D3 SERPL-MCNC: 20.8 NG/ML (ref 30–100)
ALBUMIN SERPL-MCNC: 3.9 G/DL (ref 3.5–5)
ALBUMIN/GLOB SERPL: 1.2 {RATIO} (ref 1.2–3.5)
ALP SERPL-CCNC: 62 U/L (ref 50–136)
ALT SERPL-CCNC: 31 U/L (ref 12–65)
ANION GAP SERPL CALC-SCNC: 8 MMOL/L (ref 4–13)
AST SERPL-CCNC: 21 U/L (ref 15–37)
BASOPHILS # BLD: 0.1 K/UL (ref 0–0.2)
BASOPHILS NFR BLD: 1 % (ref 0–2)
BILIRUB SERPL-MCNC: 0.4 MG/DL (ref 0.2–1.1)
BUN SERPL-MCNC: 7 MG/DL (ref 6–23)
CALCIUM SERPL-MCNC: 9.3 MG/DL (ref 8.3–10.4)
CHLORIDE SERPL-SCNC: 110 MMOL/L (ref 101–110)
CO2 SERPL-SCNC: 25 MMOL/L (ref 21–32)
CREAT SERPL-MCNC: 0.7 MG/DL (ref 0.6–1)
DIFFERENTIAL METHOD BLD: ABNORMAL
EOSINOPHIL # BLD: 0.7 K/UL (ref 0–0.8)
EOSINOPHIL NFR BLD: 9 % (ref 0.5–7.8)
ERYTHROCYTE [DISTWIDTH] IN BLOOD BY AUTOMATED COUNT: 11.7 % (ref 11.9–14.6)
GLOBULIN SER CALC-MCNC: 3.3 G/DL (ref 2.3–3.5)
GLUCOSE SERPL-MCNC: 71 MG/DL (ref 65–100)
HCT VFR BLD AUTO: 43.1 % (ref 35.8–46.3)
HGB BLD-MCNC: 14.3 G/DL (ref 11.7–15.4)
IMM GRANULOCYTES # BLD AUTO: 0 K/UL (ref 0–0.5)
IMM GRANULOCYTES NFR BLD AUTO: 0 % (ref 0–5)
LYMPHOCYTES # BLD: 1.5 K/UL (ref 0.5–4.6)
LYMPHOCYTES NFR BLD: 19 % (ref 13–44)
MCH RBC QN AUTO: 32.1 PG (ref 26.1–32.9)
MCHC RBC AUTO-ENTMCNC: 33.2 G/DL (ref 31.4–35)
MCV RBC AUTO: 96.6 FL (ref 79.6–97.8)
MONOCYTES # BLD: 0.5 K/UL (ref 0.1–1.3)
MONOCYTES NFR BLD: 7 % (ref 4–12)
NEUTS SEG # BLD: 5.1 K/UL (ref 1.7–8.2)
NEUTS SEG NFR BLD: 64 % (ref 43–78)
NRBC # BLD: 0 K/UL (ref 0–0.2)
PLATELET # BLD AUTO: 276 K/UL (ref 150–450)
PMV BLD AUTO: 11.4 FL (ref 9.4–12.3)
POTASSIUM SERPL-SCNC: 3.8 MMOL/L (ref 3.5–5.1)
PROT SERPL-MCNC: 7.2 G/DL (ref 6.3–8.2)
RBC # BLD AUTO: 4.46 M/UL (ref 4.05–5.2)
SODIUM SERPL-SCNC: 143 MMOL/L (ref 136–145)
T4 FREE SERPL-MCNC: 1 NG/DL (ref 0.78–1.46)
TIBC SERPL-MCNC: 285 UG/DL (ref 250–450)
TSH, 3RD GENERATION: 1.55 UIU/ML (ref 0.36–3.74)
WBC # BLD AUTO: 7.9 K/UL (ref 4.3–11.1)

## 2022-10-13 ENCOUNTER — OFFICE VISIT (OUTPATIENT)
Dept: FAMILY MEDICINE CLINIC | Facility: CLINIC | Age: 27
End: 2022-10-13
Payer: COMMERCIAL

## 2022-10-13 VITALS
DIASTOLIC BLOOD PRESSURE: 70 MMHG | BODY MASS INDEX: 23.75 KG/M2 | WEIGHT: 125.8 LBS | HEART RATE: 100 BPM | TEMPERATURE: 98 F | OXYGEN SATURATION: 98 % | SYSTOLIC BLOOD PRESSURE: 96 MMHG | HEIGHT: 61 IN

## 2022-10-13 DIAGNOSIS — E03.9 ACQUIRED HYPOTHYROIDISM: ICD-10-CM

## 2022-10-13 DIAGNOSIS — D50.9 IRON DEFICIENCY ANEMIA, UNSPECIFIED IRON DEFICIENCY ANEMIA TYPE: Primary | ICD-10-CM

## 2022-10-13 DIAGNOSIS — E55.9 VITAMIN D DEFICIENCY: ICD-10-CM

## 2022-10-13 DIAGNOSIS — F32.A ANXIETY AND DEPRESSION: ICD-10-CM

## 2022-10-13 DIAGNOSIS — F41.9 ANXIETY AND DEPRESSION: ICD-10-CM

## 2022-10-13 DIAGNOSIS — J45.40 MODERATE PERSISTENT ASTHMA WITHOUT COMPLICATION: ICD-10-CM

## 2022-10-13 DIAGNOSIS — L30.9 ECZEMA, UNSPECIFIED TYPE: ICD-10-CM

## 2022-10-13 PROCEDURE — 99214 OFFICE O/P EST MOD 30 MIN: CPT | Performed by: PHYSICIAN ASSISTANT

## 2022-10-13 RX ORDER — TRIAMCINOLONE ACETONIDE 5 MG/G
CREAM TOPICAL 2 TIMES DAILY
Qty: 454 G | Refills: 1 | Status: SHIPPED | OUTPATIENT
Start: 2022-10-13

## 2022-10-13 RX ORDER — DESVENLAFAXINE 100 MG/1
100 TABLET, EXTENDED RELEASE ORAL DAILY
Qty: 90 TABLET | Refills: 1 | Status: SHIPPED | OUTPATIENT
Start: 2022-10-13

## 2022-10-13 RX ORDER — LEVOTHYROXINE SODIUM 25 UG/1
25 CAPSULE ORAL DAILY
Qty: 90 CAPSULE | Refills: 1 | Status: SHIPPED | OUTPATIENT
Start: 2022-10-13

## 2022-10-13 RX ORDER — ALBUTEROL SULFATE 90 UG/1
2 AEROSOL, METERED RESPIRATORY (INHALATION) EVERY 4 HOURS PRN
Qty: 18 G | Refills: 1 | Status: SHIPPED | OUTPATIENT
Start: 2022-10-13

## 2022-10-13 RX ORDER — ERGOCALCIFEROL 1.25 MG/1
50000 CAPSULE ORAL WEEKLY
Qty: 12 CAPSULE | Refills: 1 | Status: SHIPPED | OUTPATIENT
Start: 2022-10-13

## 2022-10-13 ASSESSMENT — PATIENT HEALTH QUESTIONNAIRE - PHQ9
1. LITTLE INTEREST OR PLEASURE IN DOING THINGS: 0
SUM OF ALL RESPONSES TO PHQ QUESTIONS 1-9: 0
SUM OF ALL RESPONSES TO PHQ9 QUESTIONS 1 & 2: 0
SUM OF ALL RESPONSES TO PHQ QUESTIONS 1-9: 0
SUM OF ALL RESPONSES TO PHQ QUESTIONS 1-9: 0
2. FEELING DOWN, DEPRESSED OR HOPELESS: 0
SUM OF ALL RESPONSES TO PHQ QUESTIONS 1-9: 0

## 2022-10-13 NOTE — PROGRESS NOTES
[address] [place] [address]  Phone [phone]  Fax [phone]  Samia STEWART    Patient: Yulissa Dowd  YOB: 1995  Patient Age 32 y.o. Patient sex: female  Medical Record:  [id number]  Visit Date:10/13/2022   Author:  Anthony Cartwright. [facility] Clinic Note     Chief complaint  Yulissa Dowd  is a 32 y.o. female who was seen on 10/17/22  2:15 AM  for the following reasons:    Chief Complaint   Patient presents with    Follow-up     Lab results       Current Medical problems addressed    She has trouble falling asleep and worries about upcoming issues. She is having trouble with focus. We have tried wellbutrin without relief. She also has no energy so its hard to get things done. She is slepin g 6-8 hours. Vitamin d def -  she is not taking any vitamin d and been tired achy and sleep disruption  Lab Results   Component Value Date/Time    VITD25 20.8 10/07/2022 09:49 AM      And not controlled   ASSESSMENT AND PLAN    ICD-10-CM    1. Iron deficiency anemia, unspecified iron deficiency anemia type  D50.9       2. Moderate persistent asthma without complication  I27.44 albuterol sulfate HFA (PROVENTIL;VENTOLIN;PROAIR) 108 (90 Base) MCG/ACT inhaler      3. Acquired hypothyroidism  E03.9 Levothyroxine Sodium 25 MCG CAPS      4. Anxiety and depression  F41.9 desvenlafaxine succinate (PRISTIQ) 100 MG TB24 extended release tablet    F32. A       5. Eczema, unspecified type  L30.9 vitamin D (ERGOCALCIFEROL) 1.25 MG (99629 UT) CAPS capsule      6. Vitamin D deficiency  E55.9 Vitamin D 25 Hydroxy         Jamila Mondragon was seen today for follow-up. Diagnoses and all orders for this visit:    Iron deficiency anemia, unspecified iron deficiency anemia type    Moderate persistent asthma without complication  -     albuterol sulfate HFA (PROVENTIL;VENTOLIN;PROAIR) 108 (90 Base) MCG/ACT inhaler;  Inhale 2 puffs into the lungs every 4 hours as needed for Shortness of Breath    Acquired hypothyroidism  -     Levothyroxine Sodium 25 MCG CAPS; Take 25 mcg by mouth daily    Anxiety and depression  -     desvenlafaxine succinate (PRISTIQ) 100 MG TB24 extended release tablet; Take 1 tablet by mouth daily    Eczema, unspecified type  -     vitamin D (ERGOCALCIFEROL) 1.25 MG (75719 UT) CAPS capsule; Take 1 capsule by mouth once a week    Vitamin D deficiency  -     Vitamin D 25 Hydroxy; Future    Other orders  -     triamcinolone (ARISTOCORT) 0.5 % cream; Apply topically 2 times daily    Plan includes the following:  Discussed updating vitamin d is the key so recommend managing that and rechecking in 3 months   No follow-up provider specified. Orders Placed This Encounter   Procedures    Vitamin D 25 Hydroxy     Standing Status:   Future     Standing Expiration Date:   10/13/2023       Past Medical History: Allergies: Allergies   Allergen Reactions    Albumen, Egg Anaphylaxis    Amoxicillin-Pot Clavulanate Rash    Penicillins Rash       Current Medications:   Medications marked \"taking\" at this time:  Current Outpatient Medications   Medication Sig Dispense Refill    vitamin D (ERGOCALCIFEROL) 1.25 MG (10241 UT) CAPS capsule Take 1 capsule by mouth once a week 12 capsule 1    albuterol sulfate HFA (PROVENTIL;VENTOLIN;PROAIR) 108 (90 Base) MCG/ACT inhaler Inhale 2 puffs into the lungs every 4 hours as needed for Shortness of Breath 18 g 1    Levothyroxine Sodium 25 MCG CAPS Take 25 mcg by mouth daily 90 capsule 1    desvenlafaxine succinate (PRISTIQ) 100 MG TB24 extended release tablet Take 1 tablet by mouth daily 90 tablet 1    triamcinolone (ARISTOCORT) 0.5 % cream Apply topically 2 times daily 454 g 1    mometasone-formoterol (DULERA) 200-5 MCG/ACT inhaler Inhale 2 puffs into the lungs in the morning and 2 puffs before bedtime.  1 each 3    hydrocortisone (WESTCORT) 0.2 % cream Apply topically 2 times daily to face 45 g 1    cetirizine (ZYRTEC) 5 MG tablet Take 5 mg by mouth daily      Ferrous Sulfate 324 MG TBEC Take by mouth every morning (before breakfast)      hydrocortisone valerate (WEST-DANK) 0.2 % ointment Apply topically 2 times daily       No current facility-administered medications for this visit. Current Problem List:   Patient Active Problem List   Diagnosis    Asthma    Raynaud's syndrome    Thyroid disorder    Allergic rhinitis due to pollen    Anxiety and depression    Urinary urgency    Encounter for specialized medical examination    Birth control counseling    Acquired hypothyroidism    Iron deficiency anemia    Fatigue    Eczema    Vitamin D deficiency       Social History:   Social History     Tobacco Use    Smoking status: Never    Smokeless tobacco: Never   Substance Use Topics    Alcohol use: No       Family History:   Family History   Problem Relation Age of Onset    Colon Cancer Neg Hx     Breast Cancer Maternal Aunt     Uterine Cancer Neg Hx     Ovarian Cancer Neg Hx     Diabetes Maternal Aunt     Diabetes Maternal Grandmother     Coronary Art Dis Maternal Grandmother     No Known Problems Brother     No Known Problems Brother     No Known Problems Sister     ADHD Mother        Surgical History:  Past Surgical History:   Procedure Laterality Date    HEENT  2012 2013    sinus     INSERT PLEURAL CATHETER      drain fluid off lung age 11-9    RHINOPLASTY  03/01/2022    balloon and polyps removal    TONSILLECTOMY AND ADENOIDECTOMY         ROS  Review of Systems    BP 96/70   Pulse 100   Temp 98 °F (36.7 °C)   Ht 5' 1\" (1.549 m)   Wt 125 lb 12.8 oz (57.1 kg)   SpO2 98%   BMI 23.77 kg/m²   Body mass index is 23.77 kg/m². Physical Exam    Physical Exam     I have reviewed the patient's past medical history, social history and family history and vitals. We have discussed treatment plan and follow up and given patient instructions. Patient's questions are answered and we will follow up as indicated.   Return in about 3 months (around 1/13/2023) for Vit D Def. Josefa Chau PA-C  2:15 AM  10/17/2022

## 2022-10-14 ENCOUNTER — TELEPHONE (OUTPATIENT)
Dept: FAMILY MEDICINE CLINIC | Facility: CLINIC | Age: 27
End: 2022-10-14

## 2022-10-14 NOTE — TELEPHONE ENCOUNTER
----- Message from [facility] sent at 10/14/2022  9:49 AM EDT -----  Subject: Message to Provider    QUESTIONS  Information for Provider? Patient was wondering if she should stop taking   her Iron medicine, patient would like a call back. ---------------------------------------------------------------------------  --------------  Sara Stewart [facility]  [phone]; OK to leave message on voicemail  ---------------------------------------------------------------------------  --------------  SCRIPT ANSWERS  Relationship to Patient?  Self

## 2023-01-03 ENCOUNTER — TELEMEDICINE (OUTPATIENT)
Dept: FAMILY MEDICINE CLINIC | Facility: CLINIC | Age: 28
End: 2023-01-03
Payer: MEDICAID

## 2023-01-03 DIAGNOSIS — F90.9 ATTENTION DEFICIT HYPERACTIVITY DISORDER (ADHD), UNSPECIFIED ADHD TYPE: Primary | ICD-10-CM

## 2023-01-03 DIAGNOSIS — F41.9 ANXIETY: ICD-10-CM

## 2023-01-03 DIAGNOSIS — F33.1 MAJOR DEPRESSIVE DISORDER, RECURRENT, MODERATE (HCC): ICD-10-CM

## 2023-01-03 PROBLEM — R41.840 IMPAIRED CONCENTRATION: Status: ACTIVE | Noted: 2022-04-20

## 2023-01-03 PROBLEM — F98.8 ATTENTION DEFICIT DISORDER: Status: ACTIVE | Noted: 2023-01-03

## 2023-01-03 PROCEDURE — 99214 OFFICE O/P EST MOD 30 MIN: CPT | Performed by: PHYSICIAN ASSISTANT

## 2023-01-03 RX ORDER — BUSPIRONE HYDROCHLORIDE 5 MG/1
5 TABLET ORAL
Qty: 30 TABLET | Refills: 1 | Status: SHIPPED | OUTPATIENT
Start: 2023-01-03 | End: 2023-02-02

## 2023-01-03 RX ORDER — ATOMOXETINE 18 MG/1
18 CAPSULE ORAL DAILY
Qty: 30 CAPSULE | Refills: 3 | Status: SHIPPED | OUTPATIENT
Start: 2023-01-03

## 2023-01-03 NOTE — PROGRESS NOTES
Patient: Lisa Kat  YOB: 1995  Patient Age 32 y.o. Patient sex: female  Medical Record:  [id number]  Visit Date: 1/3/2023  Author:  Marbin Eden. [facility]    [facility] Virtual  Visit Note Video Conference Note  Location: To Patients electronic /phone device in their home  From Medical provider     Lisa Kat is a 32 y.o. y.o. female  being evaluated by a Virtual Visit (video visit) encounter to address concerns. A caregiver was present when appropriate. Due to this being a TeleHealth encounter (During EOCN-58 public health emergency), evaluation of the following organ systems was limited: Vitals/Constitutional/EENT/Resp/CV/GI//MS/Neuro/Skin/Heme-Lymph-Imm. Pursuant to the emergency declaration under the [address] [place] [address] authority and the [employer] and [employer] Act, this Virtual Visit was conducted with patient's (and/or legal guardian's) consent, to reduce the risk of exposure to COVID-19 and provide necessary medical care. Consent:  The patient and/or health care decision maker is aware that that they may receive a bill for this audio-video service, depending on his insurance coverage, and has provided verbal consent to proceed: Yes    Chief Complaint  Lisa Kat  is a 32 y.o. female who was seen by synchronous (real-time) audio-video technology on 23  9:36 AM  for the following reasons:    Chief Complaint   Patient presents with    Other     Grandmother  and more anxieyt and more sadness since the last time. She is struggling to focus and finish tasks. Current Medical issues addressed today:  Depression and anxiety - having some symptoms and not sure if its all the ADHD or lack of motivation is depression     Adhd - she notes she was on adderal in middle school but she can't remember to detais;     ASSESSMENT & PLAN    ICD-10-CM    1.  Attention deficit hyperactivity disorder (ADHD), unspecified ADHD type  F90.9 atomoxetine (STRATTERA) 18 MG capsule      2. Major depressive disorder, recurrent, moderate (HCC)  F33.1       3. Anxiety  F41.9 External Referral To Counseling Services           Diagnoses and all orders for this visit:  Attention deficit hyperactivity disorder (ADHD), unspecified ADHD type  -     atomoxetine (STRATTERA) 18 MG capsule; Take 1 capsule by mouth daily  Major depressive disorder, recurrent, moderate (HCC)  Anxiety  -     External Referral To Counseling Services  Other orders  -     busPIRone (BUSPAR) 5 MG tablet; Take 1 tablet by mouth nightly   Jessica Leary was seen today for other. Diagnoses and all orders for this visit:    Attention deficit hyperactivity disorder (ADHD), unspecified ADHD type  -     atomoxetine (STRATTERA) 18 MG capsule; Take 1 capsule by mouth daily    Major depressive disorder, recurrent, moderate (HCC)    Anxiety  -     External Referral To Counseling Services    Other orders  -     busPIRone (BUSPAR) 5 MG tablet; Take 1 tablet by mouth nightly    No follow-up provider specified. Orders Placed This Encounter   Procedures    External Referral To Counseling Services     Referral Priority:   Routine     Referral Type:   Eval and Treat     Referral Reason:   Specialty Services Required     Requested Specialty:   Professional Counselor     Number of Visits Requested:   1     Plan  Will start meds but choosing no stimulant to help with eliminating panic attacks. Orders Placed This Encounter   Procedures    External Referral To Counseling Services     Referral Priority:   Routine     Referral Type:   Eval and Treat     Referral Reason:   Specialty Services Required     Requested Specialty:   Professional Counselor     Number of Visits Requested:   1     I have reviewed the patient's past medical history, social history and family history and vitals. We have discussed treatment plan and follow up and given patient instructions.   Patient's questions are answered and we will follow up as indicated. Past Medical history  Allergies: Allergies   Allergen Reactions    Egg White (Egg Protein) Anaphylaxis    Amoxicillin-Pot Clavulanate Rash    Penicillins Rash       Current Medications:   Current Outpatient Medications   Medication Sig Dispense Refill    atomoxetine (STRATTERA) 18 MG capsule Take 1 capsule by mouth daily 30 capsule 3    busPIRone (BUSPAR) 5 MG tablet Take 1 tablet by mouth nightly 30 tablet 1    vitamin D (ERGOCALCIFEROL) 1.25 MG (18044 UT) CAPS capsule Take 1 capsule by mouth once a week 12 capsule 1    albuterol sulfate HFA (PROVENTIL;VENTOLIN;PROAIR) 108 (90 Base) MCG/ACT inhaler Inhale 2 puffs into the lungs every 4 hours as needed for Shortness of Breath 18 g 1    Levothyroxine Sodium 25 MCG CAPS Take 25 mcg by mouth daily 90 capsule 1    desvenlafaxine succinate (PRISTIQ) 100 MG TB24 extended release tablet Take 1 tablet by mouth daily 90 tablet 1    triamcinolone (ARISTOCORT) 0.5 % cream Apply topically 2 times daily 454 g 1    mometasone-formoterol (DULERA) 200-5 MCG/ACT inhaler Inhale 2 puffs into the lungs in the morning and 2 puffs before bedtime. 1 each 3    hydrocortisone (WESTCORT) 0.2 % cream Apply topically 2 times daily to face 45 g 1    cetirizine (ZYRTEC) 5 MG tablet Take 5 mg by mouth daily      hydrocortisone valerate (WEST-DANK) 0.2 % ointment Apply topically 2 times daily       No current facility-administered medications for this visit.          Current Problem List:   Patient Active Problem List   Diagnosis    Asthma    Raynaud's syndrome    Thyroid disorder    Allergic rhinitis due to pollen    Anxiety and depression    Urinary urgency    Encounter for specialized medical examination    Birth control counseling    Acquired hypothyroidism    Iron deficiency anemia    Fatigue    Eczema    Vitamin D deficiency    Major depressive disorder, recurrent, moderate (HCC)    Attention deficit disorder    Impaired concentration    Moderate persistent asthma without complication    Allergic rhinitis       Social History:   Social History     Tobacco Use    Smoking status: Never    Smokeless tobacco: Never   Substance Use Topics    Alcohol use: No       Family History:   Family History   Problem Relation Age of Onset    Colon Cancer Neg Hx     Breast Cancer Maternal Aunt     Uterine Cancer Neg Hx     Ovarian Cancer Neg Hx     Diabetes Maternal Aunt     Diabetes Maternal Grandmother     Coronary Art Dis Maternal Grandmother     No Known Problems Brother     No Known Problems Brother     No Known Problems Sister     ADHD Mother        Surgical History:  Past Surgical History:   Procedure Laterality Date    HEENT  2012 2013    sinus     INSERT PLEURAL CATHETER      drain fluid off lung age 7-8    RHINOPLASTY  03/01/2022    balloon and polyps removal    TONSILLECTOMY AND ADENOIDECTOMY         ROS  Review of Systems   Constitutional: Negative for fever. Respiratory: Negative for shortness of breath. Cardiovascular: Negative for chest pain. Neurological: Negative for syncope. Vitals:  those vailabe due to teleconference  are noted below provided by the patient's device in their home  No data recorded     There is no height or weight on file to calculate BMI. Physical Exam    Vitals reviewed. Constitutional:       Appearance: Normal appearance. HENT:      Head: Atraumatic. Eyes:      Conjunctiva/sclera: Conjunctivae normal.   Pulmonary:      Effort: Pulmonary effort is normal.   Musculoskeletal:      Cervical back: Neck supple. Skin:     Coloration: Skin is not jaundiced or pale. Neurological:      General: No focal deficit present. Mental Status: He is alert. Psychiatric:         Mood and Affect: Mood normal.         We discussed the expected course, resolution and complications of the diagnosis(es) in detail.   Medication risks, benefits, costs, interactions, and alternatives were discussed as indicated. I advised him to contact the office if his condition worsens, changes or fails to improve as anticipated. He expressed understanding with the diagnosis(es) and plan. Pursuant to the emergency declaration under the [facility] waiver authority and the [employer] and [employer] Act, this Virtual  Visit was conducted, with patient's consent, to reduce the patient's risk of exposure to COVID-19 and provide continuity of care for an established patient. Services were provided through a video synchronous discussion -- on [employer] ME virtually to substitute for in-person clinic visit  No follow-ups on file. Patient advised to call the office if symptoms worsen. Josefa Cronin PA-C  9:36 AM  1/5/2023

## 2023-01-04 ENCOUNTER — TELEPHONE (OUTPATIENT)
Dept: FAMILY MEDICINE CLINIC | Facility: CLINIC | Age: 28
End: 2023-01-04

## 2023-01-05 ENCOUNTER — TELEPHONE (OUTPATIENT)
Dept: FAMILY MEDICINE CLINIC | Facility: CLINIC | Age: 28
End: 2023-01-05

## 2023-01-06 ENCOUNTER — TELEPHONE (OUTPATIENT)
Dept: FAMILY MEDICINE CLINIC | Facility: CLINIC | Age: 28
End: 2023-01-06

## 2023-01-06 NOTE — TELEPHONE ENCOUNTER
Buspar-  Per patient this medication makes her feel very sleep can you prescribe something different?

## 2023-02-07 ENCOUNTER — NURSE ONLY (OUTPATIENT)
Dept: FAMILY MEDICINE CLINIC | Facility: CLINIC | Age: 28
End: 2023-02-07

## 2023-02-07 DIAGNOSIS — E55.9 VITAMIN D DEFICIENCY: ICD-10-CM

## 2023-02-08 LAB — 25(OH)D3 SERPL-MCNC: 82 NG/ML (ref 30–100)

## 2023-03-06 ENCOUNTER — PATIENT MESSAGE (OUTPATIENT)
Dept: FAMILY MEDICINE CLINIC | Facility: CLINIC | Age: 28
End: 2023-03-06

## 2023-03-06 RX ORDER — FLUCONAZOLE 150 MG/1
150 TABLET ORAL
Qty: 2 TABLET | Refills: 0 | Status: SHIPPED | OUTPATIENT
Start: 2023-03-06 | End: 2023-03-12

## 2023-03-06 NOTE — TELEPHONE ENCOUNTER
From: James Sorensen  To: Jos Gonzalez  Sent: 3/6/2023 11:45 AM EST  Subject: Yeast infection     Is there any way you could send me in medicine for a yeast infection? I started itching and burning during the night pretty bad.

## 2023-04-03 DIAGNOSIS — L30.9 ECZEMA, UNSPECIFIED TYPE: ICD-10-CM

## 2023-04-04 RX ORDER — ERGOCALCIFEROL 1.25 MG/1
50000 CAPSULE ORAL WEEKLY
Qty: 12 CAPSULE | Refills: 1 | Status: SHIPPED | OUTPATIENT
Start: 2023-04-04

## 2023-06-05 ENCOUNTER — NURSE ONLY (OUTPATIENT)
Dept: FAMILY MEDICINE CLINIC | Facility: CLINIC | Age: 28
End: 2023-06-05

## 2023-06-05 DIAGNOSIS — E03.9 ACQUIRED HYPOTHYROIDISM: ICD-10-CM

## 2023-06-05 DIAGNOSIS — D50.9 IRON DEFICIENCY ANEMIA, UNSPECIFIED IRON DEFICIENCY ANEMIA TYPE: ICD-10-CM

## 2023-06-05 DIAGNOSIS — E55.9 VITAMIN D DEFICIENCY: ICD-10-CM

## 2023-06-05 LAB
25(OH)D3 SERPL-MCNC: 105.1 NG/ML (ref 30–100)
BASOPHILS # BLD: 0 K/UL (ref 0–0.2)
BASOPHILS NFR BLD: 1 % (ref 0–2)
DIFFERENTIAL METHOD BLD: ABNORMAL
EOSINOPHIL # BLD: 0.7 K/UL (ref 0–0.8)
EOSINOPHIL NFR BLD: 11 % (ref 0.5–7.8)
ERYTHROCYTE [DISTWIDTH] IN BLOOD BY AUTOMATED COUNT: 11.9 % (ref 11.9–14.6)
HCT VFR BLD AUTO: 43.4 % (ref 35.8–46.3)
HGB BLD-MCNC: 14.2 G/DL (ref 11.7–15.4)
IMM GRANULOCYTES # BLD AUTO: 0 K/UL (ref 0–0.5)
IMM GRANULOCYTES NFR BLD AUTO: 0 % (ref 0–5)
IRON SERPL-MCNC: 121 UG/DL (ref 35–150)
LYMPHOCYTES # BLD: 1.4 K/UL (ref 0.5–4.6)
LYMPHOCYTES NFR BLD: 23 % (ref 13–44)
MCH RBC QN AUTO: 31.4 PG (ref 26.1–32.9)
MCHC RBC AUTO-ENTMCNC: 32.7 G/DL (ref 31.4–35)
MCV RBC AUTO: 96 FL (ref 82–102)
MONOCYTES # BLD: 0.4 K/UL (ref 0.1–1.3)
MONOCYTES NFR BLD: 7 % (ref 4–12)
NEUTS SEG # BLD: 3.7 K/UL (ref 1.7–8.2)
NEUTS SEG NFR BLD: 58 % (ref 43–78)
NRBC # BLD: 0 K/UL (ref 0–0.2)
PLATELET # BLD AUTO: 246 K/UL (ref 150–450)
PMV BLD AUTO: 11.3 FL (ref 9.4–12.3)
RBC # BLD AUTO: 4.52 M/UL (ref 4.05–5.2)
T4 FREE SERPL-MCNC: 0.8 NG/DL (ref 0.78–1.46)
TSH, 3RD GENERATION: 1.6 UIU/ML (ref 0.36–3.74)
WBC # BLD AUTO: 6.3 K/UL (ref 4.3–11.1)

## 2023-06-08 ENCOUNTER — TELEMEDICINE (OUTPATIENT)
Dept: FAMILY MEDICINE CLINIC | Facility: CLINIC | Age: 28
End: 2023-06-08

## 2023-06-08 DIAGNOSIS — Z91.199 NO-SHOW FOR APPOINTMENT: Primary | ICD-10-CM

## 2023-07-14 ENCOUNTER — TELEPHONE (OUTPATIENT)
Dept: FAMILY MEDICINE CLINIC | Facility: CLINIC | Age: 28
End: 2023-07-14

## 2023-07-14 DIAGNOSIS — J45.40 MODERATE PERSISTENT ASTHMA WITHOUT COMPLICATION: Primary | ICD-10-CM

## 2023-07-14 RX ORDER — FLUTICASONE PROPIONATE AND SALMETEROL 250; 50 UG/1; UG/1
1 POWDER RESPIRATORY (INHALATION) EVERY 12 HOURS
Qty: 60 EACH | Refills: 3 | Status: SHIPPED | OUTPATIENT
Start: 2023-07-14

## 2023-10-19 RX ORDER — TRIAMCINOLONE ACETONIDE 0.25 MG/G
CREAM TOPICAL
Qty: 15 G | Refills: 0 | Status: SHIPPED | OUTPATIENT
Start: 2023-10-19

## 2023-12-26 ENCOUNTER — TELEPHONE (OUTPATIENT)
Dept: FAMILY MEDICINE CLINIC | Facility: CLINIC | Age: 28
End: 2023-12-26

## 2023-12-26 NOTE — TELEPHONE ENCOUNTER
[address] are out of the office and they will be back tomorrow. The message we got just said referral and blood sugar. Can you please give me   More information when you get a chance?      Thanks      Anneliese sent over the portal

## 2024-01-02 ENCOUNTER — TELEPHONE (OUTPATIENT)
Dept: FAMILY MEDICINE CLINIC | Facility: CLINIC | Age: 29
End: 2024-01-02

## 2024-01-02 ENCOUNTER — TELEMEDICINE (OUTPATIENT)
Dept: FAMILY MEDICINE CLINIC | Facility: CLINIC | Age: 29
End: 2024-01-02
Payer: MEDICAID

## 2024-01-02 DIAGNOSIS — E55.9 VITAMIN D DEFICIENCY: ICD-10-CM

## 2024-01-02 DIAGNOSIS — E03.9 ACQUIRED HYPOTHYROIDISM: ICD-10-CM

## 2024-01-02 DIAGNOSIS — J45.40 MODERATE PERSISTENT ASTHMA WITHOUT COMPLICATION: ICD-10-CM

## 2024-01-02 DIAGNOSIS — D50.9 IRON DEFICIENCY ANEMIA, UNSPECIFIED IRON DEFICIENCY ANEMIA TYPE: ICD-10-CM

## 2024-01-02 DIAGNOSIS — N30.01 ACUTE CYSTITIS WITH HEMATURIA: Primary | ICD-10-CM

## 2024-01-02 PROCEDURE — 99213 OFFICE O/P EST LOW 20 MIN: CPT | Performed by: PHYSICIAN ASSISTANT

## 2024-01-02 RX ORDER — NEBULIZER ACCESSORIES
1 KIT MISCELLANEOUS DAILY PRN
Qty: 1 KIT | Refills: 0 | Status: SHIPPED | OUTPATIENT
Start: 2024-01-02

## 2024-01-02 RX ORDER — DOXYCYCLINE HYCLATE 100 MG
100 TABLET ORAL 2 TIMES DAILY
Qty: 20 TABLET | Refills: 0 | Status: SHIPPED | OUTPATIENT
Start: 2024-01-02 | End: 2024-01-12

## 2024-01-02 RX ORDER — ALBUTEROL SULFATE 90 UG/1
2 AEROSOL, METERED RESPIRATORY (INHALATION) 4 TIMES DAILY PRN
Qty: 18 G | Refills: 5 | Status: SHIPPED | OUTPATIENT
Start: 2024-01-02

## 2024-01-02 NOTE — PROGRESS NOTES
deficiency    Major depressive disorder, recurrent, moderate (HCC)    Attention deficit disorder    Impaired concentration    Moderate persistent asthma without complication    Allergic rhinitis       Social History:   Social History     Tobacco Use    Smoking status: Never    Smokeless tobacco: Never   Substance Use Topics    Alcohol use: No       Family History:   Family History   Problem Relation Age of Onset    Colon Cancer Neg Hx     Breast Cancer Maternal Aunt     Uterine Cancer Neg Hx     Ovarian Cancer Neg Hx     Diabetes Maternal Aunt     Diabetes Maternal Grandmother     Coronary Art Dis Maternal Grandmother     No Known Problems Brother     No Known Problems Brother     No Known Problems Sister     ADHD Mother        Surgical History:  Past Surgical History:   Procedure Laterality Date    HEENT  2012 2013    sinus     INSERT PLEURAL CATHETER      drain fluid off lung age 7-8    RHINOPLASTY  03/01/2022    balloon and polyps removal    TONSILLECTOMY AND ADENOIDECTOMY         ROS  Review of Systems   Constitutional: Negative for fever.   Respiratory: Negative for shortness of breath.    Cardiovascular: Negative for chest pain.   Neurological: Negative for syncope.       Vitals:  those vailabe due to teleconference  are noted below provided by the patient's device in their home  No data recorded       There is no height or weight on file to calculate BMI.     Physical Exam    Vitals reviewed.   Constitutional:       Appearance: Normal appearance.   HENT:      Head: Atraumatic.   Eyes:      Conjunctiva/sclera: Conjunctivae normal.   Pulmonary:      Effort: Pulmonary effort is normal.   Musculoskeletal:      Cervical back: Neck supple.   Skin:     Coloration: Skin is not jaundiced or pale.   Neurological:      General: No focal deficit present.      Mental Status: Patient is alert.   Psychiatric:         Mood and Affect: Mood normal.         We discussed the expected course, resolution and complications of the

## 2024-01-03 RX ORDER — SALMETEROL XINAFOATE 50 MCG
1 BLISTER, WITH INHALATION DEVICE INHALATION 2 TIMES DAILY
Qty: 1 EACH | Refills: 3 | Status: SHIPPED | OUTPATIENT
Start: 2024-01-03

## 2024-01-03 NOTE — TELEPHONE ENCOUNTER
Dulera is steroid and laba most on list are just steroid and she has tried advair and it didn't help as much  So I sent serevent and qvar to replace it

## 2024-01-09 RX ORDER — NITROFURANTOIN 25; 75 MG/1; MG/1
100 CAPSULE ORAL 2 TIMES DAILY
Qty: 20 CAPSULE | Refills: 0 | Status: SHIPPED | OUTPATIENT
Start: 2024-01-09 | End: 2024-01-19

## 2024-02-07 ENCOUNTER — OFFICE VISIT (OUTPATIENT)
Dept: FAMILY MEDICINE CLINIC | Facility: CLINIC | Age: 29
End: 2024-02-07
Payer: MEDICAID

## 2024-02-07 VITALS
DIASTOLIC BLOOD PRESSURE: 60 MMHG | TEMPERATURE: 98.2 F | HEART RATE: 98 BPM | SYSTOLIC BLOOD PRESSURE: 110 MMHG | HEIGHT: 61 IN | WEIGHT: 119.2 LBS | OXYGEN SATURATION: 100 % | BODY MASS INDEX: 22.51 KG/M2

## 2024-02-07 DIAGNOSIS — B96.89 ACUTE BACTERIAL SINUSITIS: Primary | ICD-10-CM

## 2024-02-07 DIAGNOSIS — E55.9 VITAMIN D DEFICIENCY: ICD-10-CM

## 2024-02-07 DIAGNOSIS — E03.9 ACQUIRED HYPOTHYROIDISM: ICD-10-CM

## 2024-02-07 DIAGNOSIS — D50.9 IRON DEFICIENCY ANEMIA, UNSPECIFIED IRON DEFICIENCY ANEMIA TYPE: ICD-10-CM

## 2024-02-07 DIAGNOSIS — J01.90 ACUTE BACTERIAL SINUSITIS: Primary | ICD-10-CM

## 2024-02-07 LAB
25(OH)D3 SERPL-MCNC: 34.1 NG/ML (ref 30–100)
ALBUMIN SERPL-MCNC: 3.6 G/DL (ref 3.5–5)
ALBUMIN/GLOB SERPL: 0.9 (ref 0.4–1.6)
ALP SERPL-CCNC: 61 U/L (ref 50–136)
ALT SERPL-CCNC: 17 U/L (ref 12–65)
ANION GAP SERPL CALC-SCNC: 1 MMOL/L (ref 2–11)
AST SERPL-CCNC: 11 U/L (ref 15–37)
BASOPHILS # BLD: 0.1 K/UL (ref 0–0.2)
BASOPHILS NFR BLD: 1 % (ref 0–2)
BILIRUB SERPL-MCNC: 0.2 MG/DL (ref 0.2–1.1)
BUN SERPL-MCNC: 6 MG/DL (ref 6–23)
CALCIUM SERPL-MCNC: 9.1 MG/DL (ref 8.3–10.4)
CHLORIDE SERPL-SCNC: 115 MMOL/L (ref 103–113)
CO2 SERPL-SCNC: 26 MMOL/L (ref 21–32)
CREAT SERPL-MCNC: 0.8 MG/DL (ref 0.6–1)
DIFFERENTIAL METHOD BLD: ABNORMAL
EOSINOPHIL # BLD: 0.7 K/UL (ref 0–0.8)
EOSINOPHIL NFR BLD: 9 % (ref 0.5–7.8)
ERYTHROCYTE [DISTWIDTH] IN BLOOD BY AUTOMATED COUNT: 11.9 % (ref 11.9–14.6)
GLOBULIN SER CALC-MCNC: 3.9 G/DL (ref 2.8–4.5)
GLUCOSE SERPL-MCNC: 85 MG/DL (ref 65–100)
HCT VFR BLD AUTO: 39 % (ref 35.8–46.3)
HGB BLD-MCNC: 12.8 G/DL (ref 11.7–15.4)
IMM GRANULOCYTES # BLD AUTO: 0 K/UL (ref 0–0.5)
IMM GRANULOCYTES NFR BLD AUTO: 0 % (ref 0–5)
IRON SERPL-MCNC: 65 UG/DL (ref 35–150)
LYMPHOCYTES # BLD: 1.8 K/UL (ref 0.5–4.6)
LYMPHOCYTES NFR BLD: 24 % (ref 13–44)
MCH RBC QN AUTO: 31.5 PG (ref 26.1–32.9)
MCHC RBC AUTO-ENTMCNC: 32.8 G/DL (ref 31.4–35)
MCV RBC AUTO: 96.1 FL (ref 82–102)
MONOCYTES # BLD: 0.5 K/UL (ref 0.1–1.3)
MONOCYTES NFR BLD: 6 % (ref 4–12)
NEUTS SEG # BLD: 4.7 K/UL (ref 1.7–8.2)
NEUTS SEG NFR BLD: 60 % (ref 43–78)
NRBC # BLD: 0 K/UL (ref 0–0.2)
PLATELET # BLD AUTO: 264 K/UL (ref 150–450)
PMV BLD AUTO: 11.8 FL (ref 9.4–12.3)
POTASSIUM SERPL-SCNC: 3.5 MMOL/L (ref 3.5–5.1)
PROT SERPL-MCNC: 7.5 G/DL (ref 6.3–8.2)
RBC # BLD AUTO: 4.06 M/UL (ref 4.05–5.2)
SODIUM SERPL-SCNC: 142 MMOL/L (ref 136–146)
T4 FREE SERPL-MCNC: 1 NG/DL (ref 0.78–1.46)
TSH, 3RD GENERATION: 1.07 UIU/ML (ref 0.36–3.74)
WBC # BLD AUTO: 7.8 K/UL (ref 4.3–11.1)

## 2024-02-07 PROCEDURE — 99214 OFFICE O/P EST MOD 30 MIN: CPT | Performed by: PHYSICIAN ASSISTANT

## 2024-02-07 RX ORDER — AZITHROMYCIN 250 MG/1
250 TABLET, FILM COATED ORAL SEE ADMIN INSTRUCTIONS
Qty: 6 TABLET | Refills: 1 | Status: SHIPPED | OUTPATIENT
Start: 2024-02-07 | End: 2024-02-12

## 2024-02-07 RX ORDER — PREDNISONE 10 MG/1
TABLET ORAL
Qty: 12 TABLET | Refills: 0 | Status: SHIPPED | OUTPATIENT
Start: 2024-02-07

## 2024-02-07 NOTE — PROGRESS NOTES
[facility] Medicine  [address]   [place] [address]  Phone: [phone]  Fax [phone]  Provider : Josefa Suarez PA-C      Patient: Alyse Aceves  YOB: 1995  Patient Age 28 y.o.  Patient sex: female  Medical Record:  [id number]  Visit Date:2/7/2024   Author:  Josefa Suarez PA-C    Family Practice Clinic Note     Chief complaint  Alyse Aceves  is a 28 y.o. female who was seen on 02/12/24  10:57 AM  for the following reasons:    Chief Complaint   Patient presents with    Follow-up       Current Medical problems addressed  Sinus symptoms  Started over a week ago with sinus pressure, nasal congestion and thick discolored drainage.  NO associated fever or chills   DENISE - stable h/h on screen  Lab Results   Component Value Date    WBC 7.8 02/07/2024    HGB 12.8 02/07/2024    HCT 39.0 02/07/2024    MCV 96.1 02/07/2024     02/07/2024     Hypothyroid  Lab Results   Component Value Date    TSH 1.930 01/06/2022    MXP2SUX 1.070 02/07/2024     Stable thyroid labs in January  Vitamin d def  Lab Results   Component Value Date/Time    VITD25 34.1 02/07/2024 11:40 AM      Stable but on lower end of normal     ASSESSMENT AND PLAN    ICD-10-CM    1. Acute bacterial sinusitis  J01.90     B96.89       2. Vitamin D deficiency  E55.9 Comprehensive Metabolic Panel     Vitamin D 25 Hydroxy      3. Acquired hypothyroidism  E03.9 Comprehensive Metabolic Panel     T4, Free     TSH      4. Iron deficiency anemia, unspecified iron deficiency anemia type  D50.9 Comprehensive Metabolic Panel     CBC with Auto Differential     Iron         Alyse was seen today for follow-up.    Diagnoses and all orders for this visit:    Acute bacterial sinusitis    Vitamin D deficiency  -     Comprehensive Metabolic Panel  -     Vitamin D 25 Hydroxy    Acquired hypothyroidism  -     Comprehensive Metabolic Panel  -     T4, Free  -     TSH    Iron deficiency anemia, unspecified iron deficiency anemia type  -

## 2024-03-01 ENCOUNTER — PATIENT MESSAGE (OUTPATIENT)
Dept: FAMILY MEDICINE CLINIC | Facility: CLINIC | Age: 29
End: 2024-03-01

## 2024-03-04 RX ORDER — PREDNISONE 10 MG/1
TABLET ORAL
Qty: 12 TABLET | Refills: 0 | Status: SHIPPED | OUTPATIENT
Start: 2024-03-04

## 2024-03-04 NOTE — TELEPHONE ENCOUNTER
From: Aylse Aceves  To: Josefa Suarez  Sent: 3/1/2024 9:01 AM EST  Subject: Prednisone    I am still weaving and short winded from when I was sick the other week I just got the other Z pack sale and I’m going to start taking it but I didn’t know if I needed another round of prednisone or not.

## 2024-03-27 ENCOUNTER — TELEMEDICINE (OUTPATIENT)
Dept: FAMILY MEDICINE CLINIC | Facility: CLINIC | Age: 29
End: 2024-03-27
Payer: MEDICAID

## 2024-03-27 DIAGNOSIS — J01.90 ACUTE BACTERIAL SINUSITIS: Primary | ICD-10-CM

## 2024-03-27 DIAGNOSIS — B96.89 ACUTE BACTERIAL SINUSITIS: Primary | ICD-10-CM

## 2024-03-27 DIAGNOSIS — R09.81 NASAL CONGESTION: ICD-10-CM

## 2024-03-27 PROCEDURE — 99213 OFFICE O/P EST LOW 20 MIN: CPT | Performed by: FAMILY MEDICINE

## 2024-03-27 RX ORDER — FLUTICASONE PROPIONATE 50 MCG
2 SPRAY, SUSPENSION (ML) NASAL DAILY
Qty: 48 G | Refills: 1 | Status: SHIPPED | OUTPATIENT
Start: 2024-03-27

## 2024-03-27 RX ORDER — CEPHALEXIN 500 MG/1
500 CAPSULE ORAL 2 TIMES DAILY
Qty: 20 CAPSULE | Refills: 0 | Status: SHIPPED | OUTPATIENT
Start: 2024-03-27

## 2024-03-28 DIAGNOSIS — R11.0 NAUSEA: Primary | ICD-10-CM

## 2024-03-28 RX ORDER — ONDANSETRON 4 MG/1
4 TABLET, FILM COATED ORAL EVERY 12 HOURS PRN
Qty: 20 TABLET | Refills: 0 | Status: SHIPPED | OUTPATIENT
Start: 2024-03-28

## 2024-03-30 ASSESSMENT — ENCOUNTER SYMPTOMS
COUGH: 0
PHOTOPHOBIA: 0
NAUSEA: 0
SINUS PRESSURE: 1
ABDOMINAL DISTENTION: 0
EYE PAIN: 0
SORE THROAT: 0
ABDOMINAL PAIN: 0
SHORTNESS OF BREATH: 0
BLOOD IN STOOL: 0
SINUS COMPLAINT: 1
COLOR CHANGE: 0

## 2024-03-30 NOTE — PROGRESS NOTES
Alyse Aceves, was evaluated through a synchronous (real-time) audio-video encounter. The patient (or guardian if applicable) is aware that this is a billable service, which includes applicable co-pays. This Virtual Visit was conducted with patient's (and/or legal guardian's) consent. Patient identification was verified, and a caregiver was present when appropriate.   The patient was located at Home: [address] [place] [address] [place] [address]  Provider was located at Facility (Appt Dept): [address] [place] [address]      Alyse Aceves (:  1995) is a Established patient, presenting virtually for evaluation of the following:    Assessment & Plan   Below is the assessment and plan developed based on review of pertinent history, physical exam, labs, studies, and medications.  1. Acute bacterial sinusitis  -     cephALEXin (KEFLEX) 500 MG capsule; Take 1 capsule by mouth 2 times daily, Disp-20 capsule, R-0Normal  2. Nasal congestion  -     fluticasone (FLONASE) 50 MCG/ACT nasal spray; 2 sprays by Each Nostril route daily, Disp-48 g, R-1Normal    No follow-ups on file.   OOW TODAY    Subjective   Sinus Problem  This is a new problem. The current episode started in the past 7 days. The problem is unchanged. There has been no fever. Associated symptoms include congestion and sinus pressure. Pertinent negatives include no chills, coughing, ear pain, headaches, shortness of breath or sore throat.   Review of Systems   Constitutional:  Negative for chills and fever.   HENT:  Positive for congestion and sinus pressure. Negative for ear pain, hearing loss and sore throat.    Eyes:  Negative for photophobia and pain.   Respiratory:  Negative for cough and shortness of breath.    Cardiovascular:  Negative for chest pain, palpitations and leg swelling.   Gastrointestinal:  Negative for abdominal distention, abdominal pain, blood in stool and nausea.   Genitourinary:  Negative for dysuria and

## 2024-04-10 RX ORDER — AZITHROMYCIN 250 MG/1
250 TABLET, FILM COATED ORAL SEE ADMIN INSTRUCTIONS
Qty: 6 TABLET | Refills: 0 | Status: SHIPPED | OUTPATIENT
Start: 2024-04-10 | End: 2024-04-15

## 2024-04-24 RX ORDER — BECLOMETHASONE DIPROPIONATE HFA 80 UG/1
AEROSOL, METERED RESPIRATORY (INHALATION)
Qty: 11 G | Refills: 0 | Status: SHIPPED | OUTPATIENT
Start: 2024-04-24

## 2024-06-05 DIAGNOSIS — E03.9 ACQUIRED HYPOTHYROIDISM: ICD-10-CM

## 2024-06-05 RX ORDER — LEVOTHYROXINE SODIUM 0.03 MG/1
25 TABLET ORAL DAILY
Qty: 90 TABLET | Refills: 0 | Status: SHIPPED | OUTPATIENT
Start: 2024-06-05

## 2024-08-15 ENCOUNTER — PATIENT MESSAGE (OUTPATIENT)
Dept: FAMILY MEDICINE CLINIC | Facility: CLINIC | Age: 29
End: 2024-08-15

## 2024-08-15 DIAGNOSIS — J45.40 MODERATE PERSISTENT ASTHMA WITHOUT COMPLICATION: Primary | ICD-10-CM

## 2024-09-17 DIAGNOSIS — E03.9 ACQUIRED HYPOTHYROIDISM: ICD-10-CM

## 2024-09-17 RX ORDER — LEVOTHYROXINE SODIUM 25 UG/1
25 TABLET ORAL DAILY
Qty: 90 TABLET | Refills: 0 | Status: SHIPPED | OUTPATIENT
Start: 2024-09-17

## 2024-10-02 DIAGNOSIS — J45.40 MODERATE PERSISTENT ASTHMA WITHOUT COMPLICATION: ICD-10-CM

## 2024-10-03 RX ORDER — ALBUTEROL SULFATE 90 UG/1
INHALANT RESPIRATORY (INHALATION)
Qty: 7 EACH | Refills: 0 | Status: SHIPPED | OUTPATIENT
Start: 2024-10-03

## 2024-10-15 ENCOUNTER — TELEPHONE (OUTPATIENT)
Dept: FAMILY MEDICINE CLINIC | Facility: CLINIC | Age: 29
End: 2024-10-15

## 2024-10-15 DIAGNOSIS — F41.9 ANXIETY AND DEPRESSION: ICD-10-CM

## 2024-10-15 DIAGNOSIS — F32.A ANXIETY AND DEPRESSION: ICD-10-CM

## 2024-10-15 RX ORDER — DESVENLAFAXINE 100 MG/1
100 TABLET, EXTENDED RELEASE ORAL DAILY
Qty: 90 TABLET | Refills: 1 | Status: SHIPPED | OUTPATIENT
Start: 2024-10-15

## 2024-10-15 NOTE — TELEPHONE ENCOUNTER
Pt needs the following refill:    Desvenlafaxine succinate (Pristiq) 100 mg TB24 extended release tablet  Take 1 tablet by mouth daily    Please use the [facility] pharmacy on file.    Thank you

## 2024-10-23 DIAGNOSIS — J45.40 MODERATE PERSISTENT ASTHMA WITHOUT COMPLICATION: ICD-10-CM

## 2024-10-24 RX ORDER — MOMETASONE FUROATE AND FORMOTEROL FUMARATE DIHYDRATE 100; 5 UG/1; UG/1
2 AEROSOL RESPIRATORY (INHALATION) 2 TIMES DAILY
Qty: 13 G | Refills: 0 | Status: SHIPPED | OUTPATIENT
Start: 2024-10-24

## 2024-11-08 DIAGNOSIS — J45.40 MODERATE PERSISTENT ASTHMA WITHOUT COMPLICATION: ICD-10-CM

## 2024-11-08 RX ORDER — MOMETASONE FUROATE AND FORMOTEROL FUMARATE DIHYDRATE 100; 5 UG/1; UG/1
2 AEROSOL RESPIRATORY (INHALATION) 2 TIMES DAILY
Qty: 13 G | Refills: 0 | Status: SHIPPED | OUTPATIENT
Start: 2024-11-08

## 2024-11-08 NOTE — TELEPHONE ENCOUNTER
----- Message from Soto Eller sent at 12/21/2023  3:10 PM EST -----  Subject: Referral Request    Reason for referral request? Blood Sugar  Provider patient wants to be referred to(if known):     Provider Phone Number(if known):     Additional Information for Provider?   ---------------------------------------------------------------------------  --------------  [address]    [phone]; OK to leave message on voicemail  ---------------------------------------------------------------------------  -------------- 3

## 2024-11-08 NOTE — TELEPHONE ENCOUNTER
I sent the requested medication/product in. Please let patient know to check with their pharmacy  Thanks  Josefa STEWART

## 2024-11-27 DIAGNOSIS — J45.40 MODERATE PERSISTENT ASTHMA WITHOUT COMPLICATION: ICD-10-CM

## 2024-11-27 RX ORDER — ALBUTEROL SULFATE 90 UG/1
INHALANT RESPIRATORY (INHALATION)
Qty: 7 EACH | Refills: 0 | Status: SHIPPED | OUTPATIENT
Start: 2024-11-27

## 2024-11-28 ENCOUNTER — HOSPITAL ENCOUNTER (EMERGENCY)
Age: 29
Discharge: HOME | End: 2024-11-28
Payer: MEDICAID

## 2024-11-28 ENCOUNTER — APPOINTMENT (OUTPATIENT)
Dept: GENERAL RADIOLOGY | Age: 29
End: 2024-11-28
Payer: MEDICAID

## 2024-11-28 VITALS
HEART RATE: 70 BPM | WEIGHT: 120 LBS | SYSTOLIC BLOOD PRESSURE: 115 MMHG | TEMPERATURE: 98.7 F | BODY MASS INDEX: 22.66 KG/M2 | DIASTOLIC BLOOD PRESSURE: 73 MMHG | OXYGEN SATURATION: 98 % | HEIGHT: 61 IN | RESPIRATION RATE: 18 BRPM

## 2024-11-28 DIAGNOSIS — J18.9 COMMUNITY ACQUIRED PNEUMONIA OF RIGHT MIDDLE LOBE OF LUNG: ICD-10-CM

## 2024-11-28 DIAGNOSIS — J45.21 MILD INTERMITTENT ASTHMA WITH EXACERBATION: Primary | ICD-10-CM

## 2024-11-28 LAB
FLUAV RNA SPEC QL NAA+PROBE: NOT DETECTED
FLUBV RNA SPEC QL NAA+PROBE: NOT DETECTED
SARS-COV-2 RDRP RESP QL NAA+PROBE: NOT DETECTED
SOURCE: NORMAL

## 2024-11-28 PROCEDURE — 6370000000 HC RX 637 (ALT 250 FOR IP): Performed by: PHYSICIAN ASSISTANT

## 2024-11-28 PROCEDURE — 99284 EMERGENCY DEPT VISIT MOD MDM: CPT

## 2024-11-28 PROCEDURE — 87502 INFLUENZA DNA AMP PROBE: CPT

## 2024-11-28 PROCEDURE — 94640 AIRWAY INHALATION TREATMENT: CPT

## 2024-11-28 PROCEDURE — 71045 X-RAY EXAM CHEST 1 VIEW: CPT

## 2024-11-28 PROCEDURE — 87635 SARS-COV-2 COVID-19 AMP PRB: CPT

## 2024-11-28 RX ORDER — PREDNISONE 20 MG/1
60 TABLET ORAL DAILY
Qty: 15 TABLET | Refills: 0 | Status: SHIPPED | OUTPATIENT
Start: 2024-11-28 | End: 2024-12-03

## 2024-11-28 RX ORDER — DOXYCYCLINE HYCLATE 100 MG
100 TABLET ORAL 2 TIMES DAILY
Qty: 14 TABLET | Refills: 0 | Status: SHIPPED | OUTPATIENT
Start: 2024-11-28 | End: 2024-12-05

## 2024-11-28 RX ORDER — IPRATROPIUM BROMIDE AND ALBUTEROL SULFATE 2.5; .5 MG/3ML; MG/3ML
1 SOLUTION RESPIRATORY (INHALATION)
Status: COMPLETED | OUTPATIENT
Start: 2024-11-28 | End: 2024-11-28

## 2024-11-28 RX ORDER — ALBUTEROL SULFATE 90 UG/1
2 INHALANT RESPIRATORY (INHALATION) EVERY 6 HOURS PRN
Qty: 18 G | Refills: 3 | Status: SHIPPED | OUTPATIENT
Start: 2024-11-28

## 2024-11-28 RX ADMIN — IPRATROPIUM BROMIDE AND ALBUTEROL SULFATE 1 DOSE: .5; 3 SOLUTION RESPIRATORY (INHALATION) at 16:50

## 2024-11-28 RX ADMIN — PREDNISONE 60 MG: 50 TABLET ORAL at 16:15

## 2024-11-28 ASSESSMENT — LIFESTYLE VARIABLES
HOW OFTEN DO YOU HAVE A DRINK CONTAINING ALCOHOL: NEVER
HOW MANY STANDARD DRINKS CONTAINING ALCOHOL DO YOU HAVE ON A TYPICAL DAY: PATIENT DOES NOT DRINK

## 2024-11-28 ASSESSMENT — PAIN - FUNCTIONAL ASSESSMENT: PAIN_FUNCTIONAL_ASSESSMENT: NONE - DENIES PAIN

## 2024-11-28 NOTE — ED PROVIDER NOTES
Emergency Department Provider Note       PCP: Josefa Suarez PA-C   Age: 29 y.o.   Sex: female     DISPOSITION Decision To Discharge 11/28/2024 05:25:40 PM            ICD-10-CM    1. Mild intermittent asthma with exacerbation  J45.21       2. Community acquired pneumonia of right middle lobe of lung  J18.9           Medical Decision Making     Presented here with cough and congestion.  COVID and flu swabs negative.  We obtained chest x-ray out of an abundance of caution.  Looks to be maybe a developing pneumonia.  Will prescribe him antibiotics out of an abundance of caution.  Patient will be given close return precautions.     1 acute illness with systemic symptoms.  Over the counter drug management performed.  Patient was discharged risks and benefits of hospitalization were considered.  Shared medical decision making was utilized in creating the patients health plan today.  I independently ordered and reviewed each unique test.  I interpreted the X-rays no pneumothorax.    History     Patient is a 29-year-old female with history of asthma, on rescue and steroid inhaler, also uses nebulizers at home.  Reports several days 2 weeks of worsening cough and shortness of breath.  Feels like she is having asthma exacerbation.  Has been using her inhaler 3-4 times daily.  Started using nebulizers at home as well.  Feels as if she continues to worsen.  Has a PCP appointment in the future however too far for her to feel comfortable.  Presenting here for evaluation and management.    The history is provided by the patient. No  was used.     Physical Exam     Vitals signs and nursing note reviewed:  Vitals:    11/28/24 1554 11/28/24 1555 11/28/24 1650   BP: 115/73 115/73    Pulse: 70 70    Resp: 15 18    Temp: 98.6 °F (37 °C) 98.7 °F (37.1 °C)    TempSrc: Oral Oral    SpO2: 99% 100% 98%   Weight:  54.4 kg (120 lb)    Height:  1.549 m (5' 1\")       Physical Exam  Vitals and nursing note reviewed.

## 2024-11-28 NOTE — ED TRIAGE NOTES
Patient arrived from home complaining of shortness of breath and a productive cough onset 2 weeks ago. Patient has a history of asthma and states she has been using her inhaler and breathing treatments which help her symptoms however they continue to reoccur frequently.

## 2024-12-06 ENCOUNTER — TELEMEDICINE (OUTPATIENT)
Dept: FAMILY MEDICINE CLINIC | Facility: CLINIC | Age: 29
End: 2024-12-06
Payer: MEDICAID

## 2024-12-06 DIAGNOSIS — J45.40 MODERATE PERSISTENT ASTHMA WITHOUT COMPLICATION: Primary | ICD-10-CM

## 2024-12-06 DIAGNOSIS — E03.9 ACQUIRED HYPOTHYROIDISM: ICD-10-CM

## 2024-12-06 DIAGNOSIS — J01.90 ACUTE BACTERIAL SINUSITIS: ICD-10-CM

## 2024-12-06 DIAGNOSIS — B96.89 ACUTE BACTERIAL SINUSITIS: ICD-10-CM

## 2024-12-06 PROCEDURE — 99214 OFFICE O/P EST MOD 30 MIN: CPT | Performed by: PHYSICIAN ASSISTANT

## 2024-12-06 RX ORDER — PREDNISONE 10 MG/1
TABLET ORAL
Qty: 12 TABLET | Refills: 0 | Status: SHIPPED | OUTPATIENT
Start: 2024-12-06

## 2024-12-06 RX ORDER — DEXTROAMPHETAMINE SACCHARATE, AMPHETAMINE ASPARTATE, DEXTROAMPHETAMINE SULFATE AND AMPHETAMINE SULFATE 5; 5; 5; 5 MG/1; MG/1; MG/1; MG/1
1 TABLET ORAL 3 TIMES DAILY
COMMUNITY
Start: 2024-11-15

## 2024-12-06 RX ORDER — DEXTROMETHORPHAN HYDROBROMIDE, BUPROPION HYDROCHLORIDE 105; 45 MG/1; MG/1
1 TABLET, MULTILAYER, EXTENDED RELEASE ORAL 2 TIMES DAILY
COMMUNITY

## 2024-12-06 RX ORDER — MONTELUKAST SODIUM 10 MG/1
10 TABLET ORAL
Qty: 90 TABLET | Refills: 3 | Status: SHIPPED | OUTPATIENT
Start: 2024-12-06

## 2024-12-06 RX ORDER — ALBUTEROL SULFATE 90 UG/1
2 INHALANT RESPIRATORY (INHALATION) EVERY 4 HOURS PRN
Qty: 3 EACH | Refills: 11 | Status: SHIPPED | OUTPATIENT
Start: 2024-12-06

## 2024-12-06 RX ORDER — AZITHROMYCIN 250 MG/1
250 TABLET, FILM COATED ORAL SEE ADMIN INSTRUCTIONS
Qty: 6 TABLET | Refills: 0 | Status: SHIPPED | OUTPATIENT
Start: 2024-12-06 | End: 2024-12-11

## 2024-12-06 RX ORDER — LEVOTHYROXINE SODIUM 25 UG/1
25 TABLET ORAL DAILY
Qty: 90 TABLET | Refills: 1 | Status: SHIPPED | OUTPATIENT
Start: 2024-12-06

## 2024-12-06 ASSESSMENT — PATIENT HEALTH QUESTIONNAIRE - PHQ9
1. LITTLE INTEREST OR PLEASURE IN DOING THINGS: NOT AT ALL
SUM OF ALL RESPONSES TO PHQ QUESTIONS 1-9: 1
SUM OF ALL RESPONSES TO PHQ9 QUESTIONS 1 & 2: 1
SUM OF ALL RESPONSES TO PHQ QUESTIONS 1-9: 1
2. FEELING DOWN, DEPRESSED OR HOPELESS: SEVERAL DAYS

## 2024-12-06 NOTE — PROGRESS NOTES
[facility]  [address] [place] [address]  Phone: [phone]  Fax: [phone]  Provider : Josefa Suarez PA-C      Patient: Alyse Aceves  YOB: 1995  Patient Age 29 y.o.  Patient sex: female  Medical Record:  [id number]  Visit Date: 12/6/2024  Author:  Josefa Suarez PA-C    Family Practice Virtual  Visit Note Video Conference Note  Location:  To Patients electronic /phone device in their home  From Medical provider     Alyse Aceves is a 29 y.o. y.o. female  being evaluated by a Virtual Visit (video visit) encounter to address concerns.   Consent:  The patient and/or health care decision maker is aware that that they may receive a bill for this audio-video service, depending on his insurance coverage, and has provided verbal consent to proceed: Yes    Chief Complaint  Alyse Aceves  is a 29 y.o. female who was seen by synchronous (real-time) audio-video technology on 12/12/24  6:47 AM  for the following reasons:    Chief Complaint   Patient presents with    Asthma        Current medical issues addressed today:  She went to ER on thanksgiving and treated with antibiotic and prednisone for sinus infection and she finished last dose yesterday but felt aweful in symptoms - headache with sinus pressure in frontal and maxillary and blowing nose was thick brown discharge .  It did help with the wheezing but sinuses are not resolve. She just finished doxycyline     Hypothyroid  Lab Results   Component Value Date    TSH 1.070 02/07/2024         ASSESSMENT & PLAN    ICD-10-CM    1. Moderate persistent asthma without complication  J45.40 azithromycin (ZITHROMAX) 250 MG tablet     montelukast (SINGULAIR) 10 MG tablet     XR CHEST (2 VW)     mometasone-formoterol (DULERA) 100-5 MCG/ACT inhaler     albuterol sulfate HFA (PROVENTIL;VENTOLIN;PROAIR) 108 (90 Base) MCG/ACT inhaler      2. Acute bacterial sinusitis  J01.90 azithromycin (ZITHROMAX) 250 MG tablet    B96.89       3.

## 2025-01-07 ENCOUNTER — PATIENT MESSAGE (OUTPATIENT)
Dept: FAMILY MEDICINE CLINIC | Facility: CLINIC | Age: 30
End: 2025-01-07

## 2025-01-07 RX ORDER — FAMOTIDINE 20 MG/1
20 TABLET, FILM COATED ORAL 2 TIMES DAILY PRN
Qty: 60 TABLET | Refills: 3 | Status: SHIPPED | OUTPATIENT
Start: 2025-01-07

## 2025-04-04 ENCOUNTER — LAB (OUTPATIENT)
Dept: FAMILY MEDICINE CLINIC | Facility: CLINIC | Age: 30
End: 2025-04-04

## 2025-04-04 ENCOUNTER — RESULTS FOLLOW-UP (OUTPATIENT)
Dept: FAMILY MEDICINE CLINIC | Facility: CLINIC | Age: 30
End: 2025-04-04

## 2025-04-04 DIAGNOSIS — E03.9 ACQUIRED HYPOTHYROIDISM: ICD-10-CM

## 2025-04-04 LAB
T4 FREE SERPL-MCNC: 1.2 NG/DL (ref 0.9–1.7)
TSH, 3RD GENERATION: 1.77 UIU/ML (ref 0.27–4.2)

## 2025-05-19 RX ORDER — FAMOTIDINE 20 MG/1
TABLET, FILM COATED ORAL
Qty: 60 TABLET | Refills: 0 | Status: SHIPPED | OUTPATIENT
Start: 2025-05-19

## 2025-07-17 ENCOUNTER — OFFICE VISIT (OUTPATIENT)
Dept: FAMILY MEDICINE CLINIC | Facility: CLINIC | Age: 30
End: 2025-07-17
Payer: MEDICAID

## 2025-07-17 VITALS
BODY MASS INDEX: 25.86 KG/M2 | OXYGEN SATURATION: 93 % | HEIGHT: 61 IN | WEIGHT: 137 LBS | DIASTOLIC BLOOD PRESSURE: 70 MMHG | TEMPERATURE: 98.5 F | SYSTOLIC BLOOD PRESSURE: 104 MMHG | HEART RATE: 65 BPM

## 2025-07-17 DIAGNOSIS — D50.9 IRON DEFICIENCY ANEMIA, UNSPECIFIED IRON DEFICIENCY ANEMIA TYPE: ICD-10-CM

## 2025-07-17 DIAGNOSIS — Z83.3 FAMILY HISTORY OF DIABETES MELLITUS: ICD-10-CM

## 2025-07-17 DIAGNOSIS — Z11.59 NEED FOR HEPATITIS C SCREENING TEST: ICD-10-CM

## 2025-07-17 DIAGNOSIS — R53.83 OTHER FATIGUE: ICD-10-CM

## 2025-07-17 DIAGNOSIS — E55.9 VITAMIN D DEFICIENCY: ICD-10-CM

## 2025-07-17 DIAGNOSIS — Z13.29 SCREENING FOR ENDOCRINE DISORDER: ICD-10-CM

## 2025-07-17 DIAGNOSIS — J45.40 MODERATE PERSISTENT ASTHMA WITHOUT COMPLICATION: ICD-10-CM

## 2025-07-17 DIAGNOSIS — Z13.6 SCREENING FOR HEART DISEASE: ICD-10-CM

## 2025-07-17 DIAGNOSIS — E03.9 ACQUIRED HYPOTHYROIDISM: Primary | ICD-10-CM

## 2025-07-17 DIAGNOSIS — Z00.00 WELL ADULT EXAM: ICD-10-CM

## 2025-07-17 LAB
25(OH)D3 SERPL-MCNC: 35.4 NG/ML (ref 30–100)
ALBUMIN SERPL-MCNC: 4.2 G/DL (ref 3.5–5)
ALBUMIN/GLOB SERPL: 1.3 (ref 1–1.9)
ALP SERPL-CCNC: 50 U/L (ref 35–104)
ALT SERPL-CCNC: 20 U/L (ref 8–45)
ANION GAP SERPL CALC-SCNC: 12 MMOL/L (ref 7–16)
AST SERPL-CCNC: 21 U/L (ref 15–37)
BASOPHILS # BLD: 0.05 K/UL (ref 0–0.2)
BASOPHILS NFR BLD: 1 % (ref 0–2)
BILIRUB SERPL-MCNC: 0.9 MG/DL (ref 0–1.2)
BILIRUBIN, URINE, POC: ABNORMAL
BLOOD URINE, POC: NEGATIVE
BUN SERPL-MCNC: 9 MG/DL (ref 6–23)
CALCIUM SERPL-MCNC: 9.7 MG/DL (ref 8.8–10.2)
CHLORIDE SERPL-SCNC: 103 MMOL/L (ref 98–107)
CHOLEST SERPL-MCNC: 158 MG/DL (ref 0–200)
CO2 SERPL-SCNC: 23 MMOL/L (ref 20–29)
CREAT SERPL-MCNC: 1.01 MG/DL (ref 0.6–1.1)
DIFFERENTIAL METHOD BLD: ABNORMAL
EOSINOPHIL # BLD: 0.04 K/UL (ref 0–0.8)
EOSINOPHIL NFR BLD: 0.8 % (ref 0.5–7.8)
ERYTHROCYTE [DISTWIDTH] IN BLOOD BY AUTOMATED COUNT: 11.6 % (ref 11.9–14.6)
GLOBULIN SER CALC-MCNC: 3.3 G/DL (ref 2.3–3.5)
GLUCOSE SERPL-MCNC: 91 MG/DL (ref 70–99)
GLUCOSE URINE, POC: ABNORMAL
HCT VFR BLD AUTO: 41.3 % (ref 35.8–46.3)
HCV AB SER QL: NONREACTIVE
HDLC SERPL-MCNC: 56 MG/DL (ref 40–60)
HDLC SERPL: 2.8 (ref 0–5)
HGB BLD-MCNC: 13.5 G/DL (ref 11.7–15.4)
IMM GRANULOCYTES # BLD AUTO: 0.02 K/UL (ref 0–0.5)
IMM GRANULOCYTES NFR BLD AUTO: 0.4 % (ref 0–5)
IRON SATN MFR SERPL: 55 % (ref 20–50)
IRON SERPL-MCNC: 183 UG/DL (ref 35–100)
KETONES, URINE, POC: ABNORMAL
LDLC SERPL CALC-MCNC: 91 MG/DL (ref 0–100)
LEUKOCYTE ESTERASE, URINE, POC: ABNORMAL
LYMPHOCYTES # BLD: 1.51 K/UL (ref 0.5–4.6)
LYMPHOCYTES NFR BLD: 29 % (ref 13–44)
MCH RBC QN AUTO: 31 PG (ref 26.1–32.9)
MCHC RBC AUTO-ENTMCNC: 32.7 G/DL (ref 31.4–35)
MCV RBC AUTO: 94.9 FL (ref 82–102)
MONOCYTES # BLD: 0.4 K/UL (ref 0.1–1.3)
MONOCYTES NFR BLD: 7.7 % (ref 4–12)
NEUTS SEG # BLD: 3.19 K/UL (ref 1.7–8.2)
NEUTS SEG NFR BLD: 61.1 % (ref 43–78)
NITRITE, URINE, POC: NEGATIVE
NRBC # BLD: 0 K/UL (ref 0–0.2)
PH, URINE, POC: 6 (ref 4.6–8)
PLATELET # BLD AUTO: 238 K/UL (ref 150–450)
PMV BLD AUTO: 11.5 FL (ref 9.4–12.3)
POTASSIUM SERPL-SCNC: 4.4 MMOL/L (ref 3.5–5.1)
PROT SERPL-MCNC: 7.5 G/DL (ref 6.3–8.2)
PROTEIN,URINE, POC: ABNORMAL
RBC # BLD AUTO: 4.35 M/UL (ref 4.05–5.2)
SODIUM SERPL-SCNC: 138 MMOL/L (ref 136–145)
SPECIFIC GRAVITY, URINE, POC: 1 (ref 1–1.03)
TIBC SERPL-MCNC: 330 UG/DL (ref 240–450)
TRIGL SERPL-MCNC: 59 MG/DL (ref 0–150)
TSH W FREE THYROID IF ABNORMAL: 1.83 UIU/ML (ref 0.27–4.2)
UIBC SERPL-MCNC: 147 UG/DL (ref 112–347)
URINALYSIS CLARITY, POC: CLEAR
URINALYSIS COLOR, POC: YELLOW
UROBILINOGEN, POC: ABNORMAL
VIT B12 SERPL-MCNC: 885 PG/ML (ref 193–986)
VLDLC SERPL CALC-MCNC: 12 MG/DL (ref 6–23)
WBC # BLD AUTO: 5.2 K/UL (ref 4.3–11.1)

## 2025-07-17 PROCEDURE — 99214 OFFICE O/P EST MOD 30 MIN: CPT | Performed by: PHYSICIAN ASSISTANT

## 2025-07-17 PROCEDURE — 81003 URINALYSIS AUTO W/O SCOPE: CPT | Performed by: PHYSICIAN ASSISTANT

## 2025-07-17 RX ORDER — LANOLIN ALCOHOL/MO/W.PET/CERES
1000 CREAM (GRAM) TOPICAL DAILY
COMMUNITY

## 2025-07-17 RX ORDER — LEVOTHYROXINE SODIUM 25 UG/1
25 TABLET ORAL DAILY
Qty: 90 TABLET | Refills: 1 | Status: SHIPPED | OUTPATIENT
Start: 2025-07-17

## 2025-07-17 RX ORDER — ALBUTEROL SULFATE 90 UG/1
2 INHALANT RESPIRATORY (INHALATION) EVERY 4 HOURS PRN
Qty: 18 G | Refills: 1 | Status: SHIPPED | OUTPATIENT
Start: 2025-07-17

## 2025-07-17 RX ORDER — FAMOTIDINE 20 MG/1
20 TABLET, FILM COATED ORAL 2 TIMES DAILY PRN
Qty: 180 TABLET | Refills: 1 | Status: SHIPPED | OUTPATIENT
Start: 2025-07-17

## 2025-07-17 ASSESSMENT — PATIENT HEALTH QUESTIONNAIRE - PHQ9
2. FEELING DOWN, DEPRESSED OR HOPELESS: NOT AT ALL
SUM OF ALL RESPONSES TO PHQ QUESTIONS 1-9: 0
SUM OF ALL RESPONSES TO PHQ QUESTIONS 1-9: 0
1. LITTLE INTEREST OR PLEASURE IN DOING THINGS: NOT AT ALL
SUM OF ALL RESPONSES TO PHQ QUESTIONS 1-9: 0
SUM OF ALL RESPONSES TO PHQ QUESTIONS 1-9: 0

## 2025-07-17 NOTE — PROGRESS NOTES
[facility]  [address]   [place] [address]  Phone: [phone]  Fax [phone]  Provider : Josefa Suarez PA-C      Patient: Alyse Aceves  YOB: 1995  Patient Age 30 y.o.  Patient sex: female  Medical Record:  [id number]  Visit Date:7/17/2025   Author:  Josefa Suarez PA-C    Family Practice Clinic Note     Chief complaint  Alyse Aceves  is a 30 y.o. female who was seen on 7/17/2025 for the following reasons:    Chief Complaint   Patient presents with    Annual Exam       Current Medical problems addressed    Annual wellness  She notes she plays outside but no specific exdercise.  She stopped iron and in the past was DENISE and been off about 1-2 years ago and felt tired     ASSESSMENT AND PLAN    ICD-10-CM    1. Acquired hypothyroidism  E03.9 TSH reflex to FT4     levothyroxine (SYNTHROID) 25 MCG tablet     TSH reflex to FT4      2. Moderate persistent asthma without complication  J45.40 mometasone-formoterol (DULERA) 100-5 MCG/ACT inhaler     albuterol sulfate HFA (PROVENTIL;VENTOLIN;PROAIR) 108 (90 Base) MCG/ACT inhaler      3. Vitamin D deficiency  E55.9 Vitamin D 25 Hydroxy     Vitamin D 25 Hydroxy      4. Iron deficiency anemia, unspecified iron deficiency anemia type  D50.9 Iron and TIBC     CBC with Auto Differential     Vitamin B12     Vitamin B12     CBC with Auto Differential     Iron and TIBC      5. Other fatigue  R53.83 Vitamin D 25 Hydroxy     Iron and TIBC     CBC with Auto Differential     TSH reflex to FT4     Comprehensive Metabolic Panel     AMB POC URINALYSIS DIP STICK AUTO W/O MICRO     Comprehensive Metabolic Panel     TSH reflex to FT4     CBC with Auto Differential     Iron and TIBC     Vitamin D 25 Hydroxy      6. Family history of diabetes mellitus  Z83.3 AMB POC URINALYSIS DIP STICK AUTO W/O MICRO      7. Need for hepatitis C screening test  Z11.59 Hepatitis C Antibody     Hepatitis C Antibody      8. Screening for endocrine disorder  Z13.29 Vitamin

## 2025-07-21 ASSESSMENT — ENCOUNTER SYMPTOMS
COUGH: 0
SHORTNESS OF BREATH: 0

## (undated) DEVICE — REM POLYHESIVE ADULT PATIENT RETURN ELECTRODE: Brand: VALLEYLAB

## (undated) DEVICE — SUTURE VCRL SZ 4-0 L27IN ABSRB UD L60MM KS STR REV CUT NDL J662H

## (undated) DEVICE — (D)PREP SKN CHLRAPRP APPL 26ML -- CONVERT TO ITEM 371833

## (undated) DEVICE — SOLUTION IRRIG 1000ML H2O STRL BLT

## (undated) DEVICE — SUT CHRMC 1 36IN CTX BRN --

## (undated) DEVICE — SUTURE VCRL SZ 0 L36IN ABSRB UD L36MM CT-1 1/2 CIR J946H

## (undated) DEVICE — Device: Brand: PORTEX

## (undated) DEVICE — (D)STRIP SKN CLSR 0.5X4IN WHT --

## (undated) DEVICE — SOLUTION IV 1000ML 0.9% SOD CHL

## (undated) DEVICE — MEDI-VAC NON-CONDUCTIVE SUCTION TUBING: Brand: CARDINAL HEALTH

## (undated) DEVICE — CATH FOL TY IC BAG 16FR 2000ML -- CONVERT TO ITEM 363158

## (undated) DEVICE — SUT CHRMC 3-0 27IN SH BRN --

## (undated) DEVICE — SURGICAL PROCEDURE PACK C SECT CDS

## (undated) DEVICE — KENDALL SCD EXPRESS SLEEVES, KNEE LENGTH, MEDIUM: Brand: KENDALL SCD

## (undated) DEVICE — PENCIL ES L3M BTTN SWCH S STL HEX LOK BLDE ELECTRD HOLSTER

## (undated) DEVICE — SUTURE VCRL SZ 3-0 L36IN ABSRB UD L36MM CT-1 1/2 CIR J944H

## (undated) DEVICE — STERILE POLYISOPRENE POWDER-FREE SURGICAL GLOVES: Brand: PROTEXIS